# Patient Record
Sex: MALE | Race: WHITE | NOT HISPANIC OR LATINO
[De-identification: names, ages, dates, MRNs, and addresses within clinical notes are randomized per-mention and may not be internally consistent; named-entity substitution may affect disease eponyms.]

---

## 2022-08-11 ENCOUNTER — APPOINTMENT (OUTPATIENT)
Dept: INTERNAL MEDICINE | Facility: CLINIC | Age: 27
End: 2022-08-11

## 2022-08-11 VITALS — SYSTOLIC BLOOD PRESSURE: 134 MMHG | RESPIRATION RATE: 14 BRPM | DIASTOLIC BLOOD PRESSURE: 80 MMHG | HEART RATE: 72 BPM

## 2022-08-11 VITALS — BODY MASS INDEX: 27.46 KG/M2 | WEIGHT: 214 LBS | HEIGHT: 74 IN

## 2022-08-11 DIAGNOSIS — Z78.9 OTHER SPECIFIED HEALTH STATUS: ICD-10-CM

## 2022-08-11 DIAGNOSIS — Z82.49 FAMILY HISTORY OF ISCHEMIC HEART DISEASE AND OTHER DISEASES OF THE CIRCULATORY SYSTEM: ICD-10-CM

## 2022-08-11 DIAGNOSIS — Z86.19 PERSONAL HISTORY OF OTHER INFECTIOUS AND PARASITIC DISEASES: ICD-10-CM

## 2022-08-11 DIAGNOSIS — Z87.09 PERSONAL HISTORY OF OTHER DISEASES OF THE RESPIRATORY SYSTEM: ICD-10-CM

## 2022-08-11 DIAGNOSIS — M54.50 LOW BACK PAIN, UNSPECIFIED: ICD-10-CM

## 2022-08-11 DIAGNOSIS — Z80.0 FAMILY HISTORY OF MALIGNANT NEOPLASM OF DIGESTIVE ORGANS: ICD-10-CM

## 2022-08-11 PROCEDURE — 99203 OFFICE O/P NEW LOW 30 MIN: CPT

## 2022-08-11 RX ORDER — ELECTROLYTES/DEXTROSE
SOLUTION, ORAL ORAL DAILY
Refills: 0 | Status: ACTIVE | COMMUNITY
Start: 2022-08-11

## 2022-08-11 NOTE — HISTORY OF PRESENT ILLNESS
[FreeTextEntry8] : Pt presents to establish care, recently stationed on [place] for the [employer] [employer] [facility] [place]par \par He has many orthopedic longstanding issues including low back pain, knee pain, groin/psoas pains,and b/l ankle pain\par Would like referrals\par Too early for CPE, less than one year

## 2022-08-11 NOTE — ASSESSMENT
[FreeTextEntry1] : Obtain old records\par PT for low back pain\par \par CPE in next month\par Consider orthopedic evaluation\par \par Derm referral requested as well.

## 2022-08-16 ENCOUNTER — APPOINTMENT (OUTPATIENT)
Dept: FAMILY MEDICINE | Facility: CLINIC | Age: 27
End: 2022-08-16

## 2022-08-30 ENCOUNTER — APPOINTMENT (OUTPATIENT)
Dept: FAMILY MEDICINE | Facility: CLINIC | Age: 27
End: 2022-08-30

## 2022-08-30 VITALS
WEIGHT: 222 LBS | TEMPERATURE: 97.7 F | SYSTOLIC BLOOD PRESSURE: 158 MMHG | DIASTOLIC BLOOD PRESSURE: 90 MMHG | OXYGEN SATURATION: 99 % | HEIGHT: 74 IN | BODY MASS INDEX: 28.49 KG/M2 | HEART RATE: 76 BPM

## 2022-08-30 DIAGNOSIS — Z23 ENCOUNTER FOR IMMUNIZATION: ICD-10-CM

## 2022-08-30 DIAGNOSIS — Z13.21 ENCOUNTER FOR SCREENING FOR NUTRITIONAL DISORDER: ICD-10-CM

## 2022-08-30 PROCEDURE — 99385 PREV VISIT NEW AGE 18-39: CPT | Mod: 25

## 2022-08-30 PROCEDURE — G0444 DEPRESSION SCREEN ANNUAL: CPT | Mod: 59

## 2022-08-30 PROCEDURE — 93000 ELECTROCARDIOGRAM COMPLETE: CPT | Mod: 59

## 2022-08-30 NOTE — ASSESSMENT
[FreeTextEntry1] : Annual physical: f/u routine labwork\par Polyarthralgia: c/w PT, f/u labwork, recommend evaluation with orthopedist\par Chronic low back pain: start naproxen prn, c/w PT, recommend low-mod intensity exercise/stretching, refer to orthopedist\par HTN: elevated bp, recommend low salt diet, wt loss, exercise, DASH diet, f/u US duplex and labwork for 2ndary htn\par Sinus bradycardia: mild, asymptomatic, suspect 2/2 exercise/wt training/conditioning (in [employer]), f/u labwork including thyroid\par BMI 28: Recommend low fat diet, wt loss, exercise, nutritional counseling provided, f/u lipid panel\par RTC 2 - 3 wks

## 2022-08-30 NOTE — HISTORY OF PRESENT ILLNESS
[FreeTextEntry1] : NPA/CPE. [de-identified] : 26 yo male presents for annual physical. Reports chronic low back pain, pain is 7/10 in severity, dull, worse with use, began a year ago. He says he has pain in multiple joints. He has pain in knees, hips, shoulders. Denies fever, chills, cp, palpitations, sob, nv, heat/cold intolerance, dizziness, melena, hematochezia, muscle weakness, loss of sensation, bowel/bladder incontinence or calf pain.\par

## 2022-08-30 NOTE — HEALTH RISK ASSESSMENT
[Good] : ~his/her~  mood as  good [Never] : Never [Yes] : Yes [Monthly or less (1 pt)] : Monthly or less (1 point) [1 or 2 (0 pts)] : 1 or 2 (0 points) [Less than monthly (1 pt)] : Less than monthly (1 point) [No] : In the past 12 months have you used drugs other than those required for medical reasons? No [No falls in past year] : Patient reported no falls in the past year [0] : 2) Feeling down, depressed, or hopeless: Not at all (0) [PHQ-2 Negative - No further assessment needed] : PHQ-2 Negative - No further assessment needed [HIV test declined] : HIV test declined [Hepatitis C test declined] : Hepatitis C test declined [Alone] : lives alone [Employed] : employed [Single] : single [Feels Safe at Home] : Feels safe at home [Fully functional (bathing, dressing, toileting, transferring, walking, feeding)] : Fully functional (bathing, dressing, toileting, transferring, walking, feeding) [Fully functional (using the telephone, shopping, preparing meals, housekeeping, doing laundry, using] : Fully functional and needs no help or supervision to perform IADLs (using the telephone, shopping, preparing meals, housekeeping, doing laundry, using transportation, managing medications and managing finances) [Audit-CScore] : 2 [de-identified] : regularly [de-identified] : healthy [HKV3Awcom] : 0 [Sexually Active] : not sexually active [High Risk Behavior] : no high risk behavior [Reports changes in hearing] : Reports no changes in hearing [Reports changes in vision] : Reports no changes in vision [Reports changes in dental health] : Reports no changes in dental health

## 2022-08-31 ENCOUNTER — APPOINTMENT (OUTPATIENT)
Dept: ULTRASOUND IMAGING | Facility: CLINIC | Age: 27
End: 2022-08-31

## 2022-09-02 ENCOUNTER — APPOINTMENT (OUTPATIENT)
Dept: ULTRASOUND IMAGING | Facility: CLINIC | Age: 27
End: 2022-09-02

## 2022-09-02 ENCOUNTER — APPOINTMENT (OUTPATIENT)
Dept: ORTHOPEDIC SURGERY | Facility: CLINIC | Age: 27
End: 2022-09-02

## 2022-09-02 VITALS
BODY MASS INDEX: 27.85 KG/M2 | HEART RATE: 70 BPM | SYSTOLIC BLOOD PRESSURE: 140 MMHG | DIASTOLIC BLOOD PRESSURE: 86 MMHG | WEIGHT: 217 LBS | HEIGHT: 74 IN

## 2022-09-02 PROBLEM — M25.561 RIGHT KNEE PAIN: Status: ACTIVE | Noted: 2022-09-02

## 2022-09-02 PROCEDURE — 72100 X-RAY EXAM L-S SPINE 2/3 VWS: CPT

## 2022-09-02 PROCEDURE — 99203 OFFICE O/P NEW LOW 30 MIN: CPT

## 2022-09-02 PROCEDURE — 93975 VASCULAR STUDY: CPT

## 2022-09-02 NOTE — PHYSICAL EXAM
[de-identified] : CONSTITUTIONAL: The patient is a very pleasant individual who is well-nourished and who appears stated age.\par PSYCHIATRIC: The patient is alert and oriented X 3 and in no apparent distress, and participates with orthopedic evaluation well.\par HEAD: Atraumatic and is nonsyndromic in appearance.\par EENT: No visible thyromegaly, EOMI.\par RESPIRATORY: Respiratory rate is regular, not dyspneic on examination.\par LYMPHATICS: There is no inguinal lymphadenopathy\par INTEGUMENTARY: Skin is clean, dry, and intact about the bilateral lower extremities and lumbar spine.\par VASCULAR: There is brisk capillary refill about the bilateral lower extremities.\par NEUROLOGIC: There are no pathologic reflexes. There is no decrease in sensation of the bilateral lower extremities on Wartenberg pinwheel examination. Deep tendon reflexes are well maintained at 2+/4 of the bilateral lower extremities and are symmetric..\par MUSCULOSKELETAL: There is no visible muscular atrophy. Manual motor strength is well maintained in the bilateral lower extremities. Range of motion of lumbar spine is well maintained. The patient ambulates in a non-myelopathic manner. Negative tension sign and straight leg raise bilaterally. Quad extension, ankle dorsiflexion, EHL, plantar flexion, and ankle eversion are well preserved. Normal secondary orthopaedic exam of bilateral hips, greater trochanteric area, knees and ankles, exam is consistent with lower lumbar myositis greater on the left than the right and mechanically orientated low back pain [de-identified] : X-rays of the lumbar spine taken on today's date does show possible loss of a lumbar disc space at L4-L5 and L5 and S1.  There is a well-maintained lordosis to this examiner September 2, 2022 2 views of the lumbar spine, of note there is a visible pars defect within the L4

## 2022-09-02 NOTE — DISCUSSION/SUMMARY
[de-identified] : We reviewed his x-rays that show L4-5 lumbar degenerative disc disease and associated pars defect we have specifically discussed modifying his activity.  Modified gym workouts topical modalities core strengthening continue physical therapy we will get a try a short course of a Medrol Dosepak and an NSAID.  Patient can follow-up in approximately 4 to 6 weeks if this fails we can try trigger point injections and or refer him to physical medicine rehabilitation for injection therapy

## 2022-09-02 NOTE — HISTORY OF PRESENT ILLNESS
[de-identified] : 27-year-old gentleman currently in the [employer] presents for evaluation of chronic neck and low back pain, patient gives a very good history of multiple microtrauma events of the been leading up to now also a chronic low back pain issue he has been in physical therapy for approximately 3 weeks.  Overall he is modified some of his activity.  Overall doing well at this point but he does have this mechanically orientated intermittent low back pain [Stable] : stable [___ yrs] : [unfilled] year(s) ago [1] : a current pain level of 1/10 [10] : a maximum pain level of 10/10 [Intermit.] : ~He/She~ states the symptoms seem to be intermittent [Bending] : worsened by bending [Lifting] : worsened by lifting [NSAIDs] : relieved by nonsteroidal anti-inflammatory drugs [Physical Therapy] : relieved by physical therapy [Rest] : relieved by rest [Ataxia] : no ataxia [Incontinence] : no incontinence [Loss of Dexterity] : good dexterity [Urinary Ret.] : no urinary retention

## 2022-09-06 ENCOUNTER — APPOINTMENT (OUTPATIENT)
Dept: ORTHOPEDIC SURGERY | Facility: CLINIC | Age: 27
End: 2022-09-06

## 2022-09-06 VITALS
HEIGHT: 74 IN | BODY MASS INDEX: 27.85 KG/M2 | SYSTOLIC BLOOD PRESSURE: 138 MMHG | HEART RATE: 56 BPM | DIASTOLIC BLOOD PRESSURE: 74 MMHG | WEIGHT: 217 LBS

## 2022-09-06 DIAGNOSIS — M25.561 PAIN IN RIGHT KNEE: ICD-10-CM

## 2022-09-06 DIAGNOSIS — M79.671 PAIN IN RIGHT FOOT: ICD-10-CM

## 2022-09-06 DIAGNOSIS — M76.51 PATELLAR TENDINITIS, RIGHT KNEE: ICD-10-CM

## 2022-09-06 DIAGNOSIS — M79.672 PAIN IN RIGHT FOOT: ICD-10-CM

## 2022-09-06 PROCEDURE — 99215 OFFICE O/P EST HI 40 MIN: CPT

## 2022-09-06 PROCEDURE — 73630 X-RAY EXAM OF FOOT: CPT | Mod: 50

## 2022-09-08 ENCOUNTER — APPOINTMENT (OUTPATIENT)
Dept: INTERNAL MEDICINE | Facility: CLINIC | Age: 27
End: 2022-09-08

## 2022-09-12 ENCOUNTER — APPOINTMENT (OUTPATIENT)
Dept: ORTHOPEDIC SURGERY | Facility: CLINIC | Age: 27
End: 2022-09-12

## 2022-09-12 ENCOUNTER — NON-APPOINTMENT (OUTPATIENT)
Age: 27
End: 2022-09-12

## 2022-09-12 VITALS
HEIGHT: 74 IN | OXYGEN SATURATION: 99 % | DIASTOLIC BLOOD PRESSURE: 75 MMHG | WEIGHT: 217 LBS | HEART RATE: 66 BPM | BODY MASS INDEX: 27.85 KG/M2 | SYSTOLIC BLOOD PRESSURE: 122 MMHG

## 2022-09-12 PROCEDURE — 99214 OFFICE O/P EST MOD 30 MIN: CPT

## 2022-09-12 PROCEDURE — 73503 X-RAY EXAM HIP UNI 4/> VIEWS: CPT

## 2022-09-12 NOTE — DISCUSSION/SUMMARY
[de-identified] : YUE PEREZ is a 27 year male being seen for bilateral hip pain for over a year L > R. He currently works in the [employer]. He has been doing physical therapy for the past 6 weeks with minimal improvement. He enjoys playing golf and basketball. He did 2 triathlons last summer and believes his hip pain is attributed to overuse. He states the pain radiates down from his hips. He reports back pain around the same time. He was seen by Dr. Pillai for the back pain. Patient denies any recent fevers, chills or night sweats. Patient denies any numbness, tingling sensations, burning sensations, urinary or bowel incontinence. \par \par We had a thorough discussion regarding the nature of his pain, the pathophysiology, as well as all treatment options. Based on the clinical exam and radiographs, the patient has bilateral hip impingement. Considering the patient's current presentation of pain, physical exam, and radiographs an MRI is indicated at this time to asses for tearing. A prescription for this was given to the patient. We will go over the MRI results with him upon obtaining the results in the office and advise him of further treatment options. He agrees with the above plan and all questions were answered.

## 2022-09-12 NOTE — ADDENDUM
[FreeTextEntry1] : Documented by Irina Vargas acting as a scribe for Dr. Jay and Juan Carlos Osborne PA-C on 09/12/2022. \par \par All medical record entries made by the Scribe were at my, Dr. Jay, and Juan Carlos Osborne's, direction and\par personally dictated by me on 09/12/2022. I have reviewed the chart and agree that the record\par accurately reflects my personal performance of the history, physical exam, procedure and imaging.

## 2022-09-12 NOTE — PHYSICAL EXAM
[de-identified] : Physical Exam:\par General: Well appearing, no acute distress\par Neurologic: A&Ox3, No focal deficits\par Head: NCAT without abrasions, lacerations, or ecchymosis to head, face, or scalp\par Eyes: No scleral icterus, no gross abnormalities\par Respiratory: Equal chest wall expansion bilaterally, no accessory muscle use\par Lymphatic: No lymphadenopathy palpated\par Skin: Warm and dry\par Psychiatric: Normal mood and affect\par \par On inspection of the right hip shows no gross abnormalities. No loss muscle volume. Skin appears normal. When asked to point at the most painful part of their hip, they make a C sign\par Negative Heel strike, negative log roll. \par At 90° of flexion internal rotation is limited to 5°. Extremely painful in the groin. External rotation is limited to 30°. No pain. Hip flexor strength is 4-5 because of pain.  Hip impingement signs are positive. \par Resisted straight leg raise does not produce groin pain. No signs of the iliopsoas tendinitis. MILLICENT Test (Lex's Test): Negative; FADIR Test (Labral Tear/TUNDE): Negative. Stinchfield: Negative\par No audible or palpable clicking. \par On lateral decubitus examination there is no focal area of her greater trochanteric tenderness. Abductor strength is 5 out of 5.\par \par \par On inspection of the left hip shows no gross abnormalities. No loss muscle volume. Skin appears normal. When asked to point at the most painful part of their hip, they make a C sign\par Negative Heel strike, negative log roll. \par At 90° of flexion internal rotation is limited to 5°. Extremely painful in the groin. External rotation is limited to 30°. No pain. Hip flexor strength is 4-5 because of pain.  Hip impingement signs are positive. \par Resisted straight leg raise does not produce groin pain. No signs of the iliopsoas tendinitis. MILLICENT Test (Lex's Test): Negative; FADIR Test (Labral Tear/TUNDE): Negative. Stinchfield: Negative\par No audible or palpable clicking. \par On lateral decubitus examination there is no focal area of her greater trochanteric tenderness. Abductor strength is 5 out of 5.\par \par Motor strength is intact distally, 5/5 over quads,hamstring, EHL, FHL, GSC, TA.\par Sensation intact over L1-S1 dermatomes\par  [de-identified] : Bilateral AP, frog leg, Weiss views and false profile views were performed today in the office. They demonstrate femoroacetabular impingement of the bilateral hips. Alpha angle is 54 on left and 52 on right. Lateral center edge angle is 35 on right and 35 on left. Anterior center edge angle is 42 on left and 32 on right. \par  \par \par

## 2022-09-12 NOTE — HISTORY OF PRESENT ILLNESS
[___ yrs] : [unfilled] year(s) ago [de-identified] : YUE PEREZ is a 27 year male being seen for bilateral hip pain for over a year L > R. He currently works in the [employer]. He has been doing physical therapy for the past 6 weeks with minimal improvement. He enjoys playing golf and basketball. He did 2 triathlons last summer and believes his hip pain is attributed to overuse. He states the pain radiates down from his hips. He reports back pain around the same time. He was seen by Dr. Pillai for the back pain. Patient denies any recent fevers, chills or night sweats. Patient denies any numbness, tingling sensations, burning sensations, urinary or bowel incontinence. \par

## 2022-09-13 ENCOUNTER — RESULT REVIEW (OUTPATIENT)
Age: 27
End: 2022-09-13

## 2022-09-15 NOTE — HISTORY OF PRESENT ILLNESS
[de-identified] : 09/06/2022: Patient is a 27 year-old male who presents to the office today for initial evaluation of right knee and bilateral foot pain. He had knee pain in 2016 that has gone away and then has come back over the past 1 year. The pain is in the anterior aspect of the knee and is intermittent in nature. It is worse with activity and better with rest. He denies any injury or inciting event. His pain of the feet is along the lateral aspect of both feet and is worse with activity and better with rest. This pain has been present for the past 6-8 months. He has had a history of this foot pain since playing football during his senior year in high school. He notes that it was worse when he was constantly playing football. Currently, he takes a steroid pack for his lower back. He states that he started this today. This has had no effect at all on his lower body pain. No other medications. He presents today for further treatment options.\par

## 2022-09-15 NOTE — ASSESSMENT
[FreeTextEntry1] : Patient presents with right knee and bilateral foot pain. Their symptoms are consistent with right knee patella tendonitis and pain in both feet due to accessory bones at the base of both 5th metatarsals. The nature of this condition was described at length with the patient and they verbalized understanding. \par \par Recommendations: \par -WBAT bilateral LE/Comfortable shoe wear\par -Diclofenac\par -Followup with Dr. Martinez as needed\par \par The patient was in full agreement with this plan and appreciative of their care.\par

## 2022-09-15 NOTE — PHYSICAL EXAM
[UE/LE] : Sensory: Intact in bilateral upper & lower extremities [ALL] : dorsalis pedis, posterior tibial, femoral, popliteal, and radial 2+ and symmetric bilaterally [Normal Finger/nose] : finger to nose coordination [Normal] : no peripheral adenopathy appreciated [Poor Appearance] : well-appearing [Acute Distress] : not in acute distress [de-identified] : Right Knee Exam\par \par FROM to hip\par \par Skin Warm, Dry, no erythema, no lesions \par \par Knee \par stable\par anatomic alignment\par ROM 0-130\par Valgus/Varus Stress intact \par Effusion - Negative\par Crepitus - Negative \par Patella Grind - Negative \par Patella Apprehension - Negative \par Medial joint line tenderness - Negative\par Lateral Joint line tenderness - Negative\par Arron Test - Negative  \par Lachman test - Negative \par Anterior Drawer Test -  Negative \par \par Distal Motor Function intact \par Sensation intact to light touch bilaterally \par Pulses 2+ bilaterally\par \par Bilateral Foot/Ankle Exam\par  \par Skin warm, Dry, no lesions, no erythema, no bleeding, no open wounds\par No Scars \par Pulse to DP and PT intact \par Sensation to touch intact \par Syndesmotic Squeeze test - Negative\par \par No deformities noted to foot or ankle \par \par Swelling - None\par Tenderness - Mild tenderness to base of both 5th MTs\par No tenderness to \par Lateral Mall, Medial Mall, Achilles, Calcaneus, mid foot, toes. \par No Anterior Drawer noted \par Tong Test - reveals intact Achilles  \par Orozco's Test Negative \par \par ROM\par Plantarflexion - 0-50\par Dorsiflexion - 0-20\par Inversion - 0-35\par Eversion 0-15\par \par No tenderness to proximal fibula\par  [de-identified] : NETTIER [de-identified] : 3 xray views of the right knee were obtained. No fractures or dislocations are noted.\par \par \par 3 xray views of bilateral feet were obtained. No fractures or dislocations are noted. There are calcifications noted at the base of both 5th metatarsals which may represent prior injury vs. accessory bone.

## 2022-09-17 ENCOUNTER — RESULT REVIEW (OUTPATIENT)
Age: 27
End: 2022-09-17

## 2022-09-17 ENCOUNTER — APPOINTMENT (OUTPATIENT)
Dept: MRI IMAGING | Facility: CLINIC | Age: 27
End: 2022-09-17

## 2022-09-17 ENCOUNTER — OUTPATIENT (OUTPATIENT)
Dept: OUTPATIENT SERVICES | Facility: HOSPITAL | Age: 27
LOS: 1 days | End: 2022-09-17
Payer: OTHER GOVERNMENT

## 2022-09-17 DIAGNOSIS — M25.851 OTHER SPECIFIED JOINT DISORDERS, RIGHT HIP: ICD-10-CM

## 2022-09-17 DIAGNOSIS — M25.852 OTHER SPECIFIED JOINT DISORDERS, LEFT HIP: ICD-10-CM

## 2022-09-17 PROCEDURE — 73721 MRI JNT OF LWR EXTRE W/O DYE: CPT

## 2022-09-17 PROCEDURE — 73721 MRI JNT OF LWR EXTRE W/O DYE: CPT | Mod: 26,LT,76

## 2022-09-17 PROCEDURE — 73721 MRI JNT OF LWR EXTRE W/O DYE: CPT | Mod: 26,RT

## 2022-09-19 LAB
25(OH)D3 SERPL-MCNC: 68.1 NG/ML
ALBUMIN SERPL ELPH-MCNC: 5 G/DL
ALDOSTERONE SERUM: 11.5 NG/DL
ALP BLD-CCNC: 48 U/L
ALT SERPL-CCNC: 22 U/L
ANA SER IF-ACNC: NEGATIVE
ANION GAP SERPL CALC-SCNC: 13 MMOL/L
APPEARANCE: CLEAR
AST SERPL-CCNC: 20 U/L
BACTERIA: NEGATIVE
BASOPHILS # BLD AUTO: 0.08 K/UL
BASOPHILS NFR BLD AUTO: 0.7 %
BILIRUB SERPL-MCNC: 0.4 MG/DL
BILIRUBIN URINE: NEGATIVE
BLOOD URINE: NEGATIVE
BUN SERPL-MCNC: 24 MG/DL
CALCIUM SERPL-MCNC: 10.3 MG/DL
CCP AB SER IA-ACNC: <8 UNITS
CHLORIDE SERPL-SCNC: 103 MMOL/L
CHOLEST SERPL-MCNC: 234 MG/DL
CO2 SERPL-SCNC: 26 MMOL/L
COLOR: NORMAL
CREAT SERPL-MCNC: 1.19 MG/DL
EGFR: 87 ML/MIN/1.73M2
EOSINOPHIL # BLD AUTO: 0.33 K/UL
EOSINOPHIL NFR BLD AUTO: 3 %
ERYTHROCYTE [SEDIMENTATION RATE] IN BLOOD BY WESTERGREN METHOD: 2 MM/HR
ESTIMATED AVERAGE GLUCOSE: 108 MG/DL
GLUCOSE QUALITATIVE U: NEGATIVE
GLUCOSE SERPL-MCNC: 94 MG/DL
HBA1C MFR BLD HPLC: 5.4 %
HCT VFR BLD CALC: 49.3 %
HDLC SERPL-MCNC: 47 MG/DL
HGB BLD-MCNC: 16.5 G/DL
HYALINE CASTS: 0 /LPF
IMM GRANULOCYTES NFR BLD AUTO: 0.4 %
KETONES URINE: NEGATIVE
LDLC SERPL CALC-MCNC: 166 MG/DL
LEUKOCYTE ESTERASE URINE: NEGATIVE
LYMPHOCYTES # BLD AUTO: 3.48 K/UL
LYMPHOCYTES NFR BLD AUTO: 31.9 %
MAN DIFF?: NORMAL
MCHC RBC-ENTMCNC: 29.9 PG
MCHC RBC-ENTMCNC: 33.5 GM/DL
MCV RBC AUTO: 89.3 FL
METANEPHRINE, PL: 18.3 PG/ML
MICROSCOPIC-UA: NORMAL
MONOCYTES # BLD AUTO: 0.69 K/UL
MONOCYTES NFR BLD AUTO: 6.3 %
NEUTROPHILS # BLD AUTO: 6.3 K/UL
NEUTROPHILS NFR BLD AUTO: 57.7 %
NITRITE URINE: NEGATIVE
NONHDLC SERPL-MCNC: 186 MG/DL
NORMETANEPHRINE, PL: 79.8 PG/ML
PH URINE: 6
PLATELET # BLD AUTO: 286 K/UL
POTASSIUM SERPL-SCNC: 4.7 MMOL/L
PROT SERPL-MCNC: 7.4 G/DL
PROTEIN URINE: NEGATIVE
RBC # BLD: 5.52 M/UL
RBC # FLD: 12.7 %
RED BLOOD CELLS URINE: 0 /HPF
RENIN ACTIVITY, PLASMA: 0.95 NG/ML/HR
RF+CCP IGG SER-IMP: NEGATIVE
RHEUMATOID FACT SER QL: <10 IU/ML
SODIUM SERPL-SCNC: 142 MMOL/L
SPECIFIC GRAVITY URINE: 1.02
SQUAMOUS EPITHELIAL CELLS: 0 /HPF
TRIGL SERPL-MCNC: 101 MG/DL
TSH SERPL-ACNC: 1.74 UIU/ML
UROBILINOGEN URINE: NORMAL
WBC # FLD AUTO: 10.92 K/UL
WHITE BLOOD CELLS URINE: 0 /HPF

## 2022-09-22 ENCOUNTER — NON-APPOINTMENT (OUTPATIENT)
Age: 27
End: 2022-09-22

## 2022-09-26 ENCOUNTER — APPOINTMENT (OUTPATIENT)
Dept: ORTHOPEDIC SURGERY | Facility: CLINIC | Age: 27
End: 2022-09-26

## 2022-09-26 PROCEDURE — 99442: CPT

## 2022-09-28 ENCOUNTER — OUTPATIENT (OUTPATIENT)
Dept: OUTPATIENT SERVICES | Facility: HOSPITAL | Age: 27
LOS: 1 days | End: 2022-09-28
Payer: OTHER GOVERNMENT

## 2022-09-28 ENCOUNTER — APPOINTMENT (OUTPATIENT)
Dept: MRI IMAGING | Facility: CLINIC | Age: 27
End: 2022-09-28

## 2022-09-28 DIAGNOSIS — R93.89 ABNORMAL FINDINGS ON DIAGNOSTIC IMAGING OF OTHER SPECIFIED BODY STRUCTURES: ICD-10-CM

## 2022-09-28 DIAGNOSIS — I10 ESSENTIAL (PRIMARY) HYPERTENSION: ICD-10-CM

## 2022-09-28 PROCEDURE — 74185 MRA ABD W OR W/O CNTRST: CPT | Mod: 26

## 2022-09-28 PROCEDURE — C8902: CPT

## 2022-09-28 PROCEDURE — A9585: CPT

## 2022-09-30 ENCOUNTER — APPOINTMENT (OUTPATIENT)
Dept: ORTHOPEDIC SURGERY | Facility: CLINIC | Age: 27
End: 2022-09-30

## 2022-10-17 ENCOUNTER — APPOINTMENT (OUTPATIENT)
Dept: ORTHOPEDIC SURGERY | Facility: CLINIC | Age: 27
End: 2022-10-17

## 2022-10-17 VITALS
SYSTOLIC BLOOD PRESSURE: 141 MMHG | HEIGHT: 74 IN | DIASTOLIC BLOOD PRESSURE: 86 MMHG | HEART RATE: 50 BPM | BODY MASS INDEX: 27.85 KG/M2 | WEIGHT: 217 LBS

## 2022-10-17 DIAGNOSIS — M25.50 PAIN IN UNSPECIFIED JOINT: ICD-10-CM

## 2022-10-17 PROCEDURE — 99214 OFFICE O/P EST MOD 30 MIN: CPT

## 2022-10-17 PROCEDURE — 73630 X-RAY EXAM OF FOOT: CPT | Mod: 50

## 2022-10-17 NOTE — PHYSICAL EXAM
[de-identified] : General: Alert and oriented x3. In no acute distress. Pleasant in nature with a normal affect. No apparent respiratory distress.\par \par Left Foot Exam\par Skin: Clean, dry, intact\par Inspection: No obvious malalignment, no masses, no swelling, no effusion\par Pulses: 2+ DP/PT pulses\par ROM: FOOT Full  ROM of digits, ANKLE 10 degrees of dorsiflexion, 40 degrees of plantarflexion, 10 degrees of subtalar motion.\par Painful ROM: None\par Tenderness: No tenderness over the medial malleolus, No tenderness over the lateral malleolus, no CFL/ATFL/PTFL pain, no deltoid ligament pain. No heel pain. No Achilles tenderness. No 5th metatarsal pain. No pain to the LisFranc joint. No ttp over the posterior tibial tendon.\par Stability: Negative anterior/posterior drawer.\par Strength: 5/5 ADD/ABD/TA/GS/EHL/FHL/EDL\par Neuro: Sensation in tact to light touch throughout\par Additional tests: Negative Mortons test, negative tarsal tunnel tinels, negative single heel rise.	\par \par Right Foot Exam\par Skin: Clean, dry, intact\par Inspection: No obvious malalignment, no masses, no swelling, no effusion\par Pulses: 2+ DP/PT pulses\par ROM: FOOT Full  ROM of digits, ANKLE 10 degrees of dorsiflexion, 40 degrees of plantarflexion, 10 degrees of subtalar motion.\par Painful ROM: None\par Tenderness: No tenderness over the medial malleolus, No tenderness over the lateral malleolus, no CFL/ATFL/PTFL pain, no deltoid ligament pain. No heel pain. No Achilles tenderness. No 5th metatarsal pain. No pain to the LisFranc joint. No ttp over the posterior tibial tendon.\par Stability: Negative anterior/posterior drawer.\par Strength: 5/5 ADD/ABD/TA/GS/EHL/FHL/EDL\par Neuro: Sensation in tact to light touch throughout\par Additional tests: Negative Mortons test, negative tarsal tunnel tinels, negative single heel rise.			 [de-identified] : X-rays of the bilateral foot were ordered, obtained, and reviewed by me today, 10/17/2022, revealed: Os vesalianum bilaterally.

## 2022-10-17 NOTE — ADDENDUM
[FreeTextEntry1] : I, Cathie Gilmore, acted solely as a scribe for Dr. Andre Martinez on this date 10/17/2022.\par \par All medical record entries made by the Scribe were at my, Dr. Andre Martinez, direction and personally dictated by me on 10/17/2022. I have reviewed the chart and agree that the record accurately reflects my personal performance of the history, physical exam, assessment and plan. I have also personally directed, reviewed, and agreed with the chart.	\par

## 2022-10-17 NOTE — HISTORY OF PRESENT ILLNESS
[FreeTextEntry1] : The patient is a 27 year old male presenting for an initial evaluation of bilateral foot pain, right greater than left. The onset is stated to be in 2015. He currently works in the [employer].  He is active in football, baseball, and has completed 2 triathlons. The patient cannot attribute their pain to any acute injury, fall, or trauma. He rates his pain as a 0 out of 10 in the office today, however his pain scale is reported to increase with activity and ambulation. Rest is noted to improve his pain scale. The timing of his pain is reported to be intermittent. He presents wearing Crocs and is walking without assistance. The patient is currently attending PT for hips. He has tried oral NSAIDs PRN and has previously utilized a steroid taper pack for this with concerns of improvement. No other complaints.

## 2022-10-17 NOTE — DISCUSSION/SUMMARY
[de-identified] : Today I had a lengthy discussion with the patient regarding their bilateral foot pain. I have addressed all the patient's concerns surrounding the pathology of their condition. XR imaging was completed in office today and results were reviewed with the patient. We discussed both operative and non-operative treatment options in the office. I recommend that the patient utilize meloxicam with food once per day as instructed. A prescription for the meloxicam was ordered for the patient in the office today. I recommend the patient undergo a course of physical therapy for the bilateral feet  2-3 times a week for a total of 8-12 weeks. A prescription was given for the physical therapy today.		\par \par The patient understood and verbally agreed to the treatment plan. All of their questions were answered and they were satisfied with the visit. The patient should call the office if they have any questions or experience worsening symptoms. I would like to see the patient back in the office in 6-8 weeks to reassess their condition. 				\par

## 2022-11-02 ENCOUNTER — APPOINTMENT (OUTPATIENT)
Dept: FAMILY MEDICINE | Facility: CLINIC | Age: 27
End: 2022-11-02

## 2022-11-02 VITALS
TEMPERATURE: 97.9 F | WEIGHT: 215 LBS | DIASTOLIC BLOOD PRESSURE: 82 MMHG | HEART RATE: 66 BPM | SYSTOLIC BLOOD PRESSURE: 126 MMHG | BODY MASS INDEX: 27.59 KG/M2 | OXYGEN SATURATION: 97 % | HEIGHT: 74 IN

## 2022-11-02 DIAGNOSIS — D72.829 ELEVATED WHITE BLOOD CELL COUNT, UNSPECIFIED: ICD-10-CM

## 2022-11-02 PROCEDURE — 36415 COLL VENOUS BLD VENIPUNCTURE: CPT

## 2022-11-02 PROCEDURE — 99214 OFFICE O/P EST MOD 30 MIN: CPT | Mod: 25

## 2022-11-02 RX ORDER — METHYLPREDNISOLONE 4 MG/1
4 TABLET ORAL
Qty: 1 | Refills: 0 | Status: DISCONTINUED | COMMUNITY
Start: 2022-09-02 | End: 2022-11-02

## 2022-11-02 RX ORDER — NAPROXEN 500 MG/1
500 TABLET ORAL
Qty: 42 | Refills: 0 | Status: DISCONTINUED | COMMUNITY
Start: 2022-08-30 | End: 2022-11-02

## 2022-11-02 RX ORDER — DICLOFENAC SODIUM 75 MG/1
75 TABLET, DELAYED RELEASE ORAL
Qty: 60 | Refills: 0 | Status: DISCONTINUED | COMMUNITY
Start: 2022-09-06 | End: 2022-11-02

## 2022-11-02 RX ORDER — NAPROXEN 500 MG/1
500 TABLET ORAL
Qty: 60 | Refills: 1 | Status: DISCONTINUED | COMMUNITY
Start: 2022-09-02 | End: 2022-11-02

## 2022-11-02 NOTE — HISTORY OF PRESENT ILLNESS
[FreeTextEntry1] : Follow Up  [de-identified] : 28 yo male presents for follow up of HTN. reports feeling well. Denies fever, chills, cp, palpitations, sob, nv, heat/cold intolerance, dizziness, melena, hematochezia, muscle weakness, loss of sensation, bowel/bladder incontinence or calf pain.\par

## 2022-11-02 NOTE — ASSESSMENT
[FreeTextEntry1] : HTN: bp within acceptable range, c/w diet/lifestyle control, recommend low salt diet, wt loss, exercise, DASH diet\par Elevated BUN: recommend increased hydration, f/u repeat BMP\par Leukocytosis: likely reactive, f/u repeat CBC\par RTC 3 wks

## 2022-11-03 ENCOUNTER — APPOINTMENT (OUTPATIENT)
Dept: ORTHOPEDIC SURGERY | Facility: CLINIC | Age: 27
End: 2022-11-03

## 2022-11-03 LAB
ANION GAP SERPL CALC-SCNC: 12 MMOL/L
BASOPHILS # BLD AUTO: 0.1 K/UL
BASOPHILS NFR BLD AUTO: 1.3 %
BUN SERPL-MCNC: 20 MG/DL
CALCIUM SERPL-MCNC: 9.6 MG/DL
CHLORIDE SERPL-SCNC: 101 MMOL/L
CO2 SERPL-SCNC: 24 MMOL/L
CREAT SERPL-MCNC: 1.2 MG/DL
EGFR: 86 ML/MIN/1.73M2
EOSINOPHIL # BLD AUTO: 1.5 K/UL
EOSINOPHIL NFR BLD AUTO: 19.8 %
GLUCOSE SERPL-MCNC: 78 MG/DL
HCT VFR BLD CALC: 46.1 %
HGB BLD-MCNC: 15 G/DL
IMM GRANULOCYTES NFR BLD AUTO: 0.3 %
LYMPHOCYTES # BLD AUTO: 2.75 K/UL
LYMPHOCYTES NFR BLD AUTO: 36.3 %
MAN DIFF?: NORMAL
MCHC RBC-ENTMCNC: 29.5 PG
MCHC RBC-ENTMCNC: 32.5 GM/DL
MCV RBC AUTO: 90.6 FL
MONOCYTES # BLD AUTO: 0.55 K/UL
MONOCYTES NFR BLD AUTO: 7.3 %
NEUTROPHILS # BLD AUTO: 2.66 K/UL
NEUTROPHILS NFR BLD AUTO: 35 %
PLATELET # BLD AUTO: 271 K/UL
POTASSIUM SERPL-SCNC: 4.4 MMOL/L
RBC # BLD: 5.09 M/UL
RBC # FLD: 12.9 %
SODIUM SERPL-SCNC: 137 MMOL/L
WBC # FLD AUTO: 7.58 K/UL

## 2022-11-07 ENCOUNTER — OUTPATIENT (OUTPATIENT)
Dept: OUTPATIENT SERVICES | Facility: HOSPITAL | Age: 27
LOS: 1 days | Discharge: HOME | End: 2022-11-07

## 2022-11-07 ENCOUNTER — APPOINTMENT (OUTPATIENT)
Dept: ORTHOPEDIC SURGERY | Facility: CLINIC | Age: 27
End: 2022-11-07

## 2022-11-07 DIAGNOSIS — D72.10 EOSINOPHILIA, UNSPECIFIED: ICD-10-CM

## 2022-11-07 PROCEDURE — 99214 OFFICE O/P EST MOD 30 MIN: CPT

## 2022-11-07 NOTE — HISTORY OF PRESENT ILLNESS
[de-identified] : Patient is a 27-year-old [employer] employee here today for follow-up evaluation of the bilateral hips.  Patient states has been performing physical therapy on his lower back and both hips.  He has not noticed much improvement with conservative treatment.  He states the left lower back is more painful than his hip and groin pain.  He has been seen by Dr. Pillai.  Freya recommended PT and then is following up with him next week.  He denies radicular pain.

## 2022-11-07 NOTE — DISCUSSION/SUMMARY
[de-identified] : I had a lengthy discussion with the patient regarding their current condition. We discussed the treatment options including operative and nonoperative management. At this time I recommended conservative management.  At this time he feels his back pain is more painful than his hip pain.  I recommended he follow-up with spine, they may recommend epidural or trigger point type injections.  They can be performed therapeutically and diagnostically.  We will see if they relieve some of his hip pain.  If they do not we can consider hip injections in the future as well as arthroscopy.  He would like to avoid surgery if possible.  He will follow-up with us in the future based on his response to his spine treatments.  All questions were answered.

## 2022-11-07 NOTE — PHYSICAL EXAM
[de-identified] : Physical Exam: \par General: Well appearing, no acute distress \par Neurologic: A&Ox3, No focal deficits \par Head: NCAT without abrasions, lacerations, or ecchymosis to head, face, or scalp \par Eyes: No scleral icterus, no gross abnormalities \par Respiratory: Equal chest wall expansion bilaterally, no accessory muscle use \par Lymphatic: No lymphadenopathy palpated \par Skin: Warm and dry \par Psychiatric: Normal mood and affect\par \par Examination of the Right hip reveals no obvious deformity or leg length discrepancy. There is no swelling noted. The patient is nontender to palpation over the greater trochanter, groin, and IT band. The patients range of motion is to 110 degrees of hip flexion, 40 degrees of abduction, 20 degrees of adduction, 15 degrees of internal rotation and 35 degrees of external rotation. The patient has 5/5 strength to resisted hip flexion, abduction and adduction. The patient has a negative MILLICENT and positive FADIR Test. Positve Nicole Test. Positive resisted SLR test at 30 degrees of hip flexion (Stinchfield). Negative Piriformis Test. No SI joint instability. There is no pain with logrolling. The calf and thigh are soft and nontender bilaterally. The patient is grossly neurovascularly intact distally.\par \par Examination of the Left hip reveals no obvious deformity or leg length discrepancy. There is no swelling noted. The patient is nontender to palpation over the greater trochanter, groin, and IT band. The patients range of motion is to 110 degrees of hip flexion, 40 degrees of abduction, 20 degrees of adduction, 15 degrees of internal rotation and 35 degrees of external rotation. The patient has 5/5 strength to resisted hip flexion, abduction and adduction. The patient has a negative MILLICENT and positive FADIR Test. Positve Nicole Test. Positive resisted SLR test at 30 degrees of hip flexion (Stinchfield). Negative Piriformis Test. No SI joint instability. There is no pain with logrolling. The calf and thigh are soft and nontender bilaterally. The patient is grossly neurovascularly intact distally.\par \par

## 2022-11-08 ENCOUNTER — RESULT REVIEW (OUTPATIENT)
Age: 27
End: 2022-11-08

## 2022-11-08 ENCOUNTER — APPOINTMENT (OUTPATIENT)
Dept: HEMATOLOGY ONCOLOGY | Facility: CLINIC | Age: 27
End: 2022-11-08

## 2022-11-08 ENCOUNTER — NON-APPOINTMENT (OUTPATIENT)
Age: 27
End: 2022-11-08

## 2022-11-08 VITALS
TEMPERATURE: 97.2 F | WEIGHT: 221 LBS | SYSTOLIC BLOOD PRESSURE: 164 MMHG | HEIGHT: 74 IN | OXYGEN SATURATION: 100 % | DIASTOLIC BLOOD PRESSURE: 78 MMHG | HEART RATE: 51 BPM | BODY MASS INDEX: 28.36 KG/M2

## 2022-11-08 LAB
BASOPHILS # BLD AUTO: 0.1 K/UL — SIGNIFICANT CHANGE UP (ref 0–0.2)
BASOPHILS NFR BLD AUTO: 1.5 % — SIGNIFICANT CHANGE UP (ref 0–2)
EOSINOPHIL # BLD AUTO: 1.4 K/UL — HIGH (ref 0–0.5)
EOSINOPHIL NFR BLD AUTO: 18.3 % — HIGH (ref 0–6)
HCT VFR BLD CALC: 49.9 % — SIGNIFICANT CHANGE UP (ref 39–50)
HGB BLD-MCNC: 16.2 G/DL — SIGNIFICANT CHANGE UP (ref 13–17)
LYMPHOCYTES # BLD AUTO: 2.6 K/UL — SIGNIFICANT CHANGE UP (ref 1–3.3)
LYMPHOCYTES # BLD AUTO: 33.4 % — SIGNIFICANT CHANGE UP (ref 13–44)
MCHC RBC-ENTMCNC: 30.4 PG — SIGNIFICANT CHANGE UP (ref 27–34)
MCHC RBC-ENTMCNC: 32.5 G/DL — SIGNIFICANT CHANGE UP (ref 32–36)
MCV RBC AUTO: 93.6 FL — SIGNIFICANT CHANGE UP (ref 80–100)
MONOCYTES # BLD AUTO: 0.5 K/UL — SIGNIFICANT CHANGE UP (ref 0–0.9)
MONOCYTES NFR BLD AUTO: 6.9 % — SIGNIFICANT CHANGE UP (ref 2–14)
NEUTROPHILS # BLD AUTO: 3.1 K/UL — SIGNIFICANT CHANGE UP (ref 1.8–7.4)
NEUTROPHILS NFR BLD AUTO: 39.9 % — LOW (ref 43–77)
PLATELET # BLD AUTO: 265 K/UL — SIGNIFICANT CHANGE UP (ref 150–400)
RBC # BLD: 5.33 M/UL — SIGNIFICANT CHANGE UP (ref 4.2–5.8)
RBC # FLD: 11.7 % — SIGNIFICANT CHANGE UP (ref 10.3–14.5)
WBC # BLD: 7.9 K/UL — SIGNIFICANT CHANGE UP (ref 3.8–10.5)
WBC # FLD AUTO: 7.9 K/UL — SIGNIFICANT CHANGE UP (ref 3.8–10.5)

## 2022-11-08 PROCEDURE — 99243 OFF/OP CNSLTJ NEW/EST LOW 30: CPT

## 2022-11-08 NOTE — ASSESSMENT
[FreeTextEntry1] : Mr. Ramirez is a 28 yo WM, currently active duty in [employer], stationed in [place], [place], being referred by Dr. Byrne for a new Eosinophilia.\par  Patient had a  CBC done in Sept. 2022 which showed a WBC-10.92, with ANC-6.3, no eosinophilia. On November 2, 2022 his WBC was 7.58, With Eos % of 19.8, Abs. Eos -1.5. . He denies any history of asthma, or allergies. He recently traveled to [place] in October , and had one episode of GI upset . He has not traveled outside of the  .\par  This is his first fall season up here in [place].\par He has chronic back and hip and  foot pain, has seen orthopedics and thought to be secondary to past trauma.\par Today his CBC shows WBC-7.9, HGb-16.2, HCT-49.9, Plat-265,000, ANC-3.1, ALC-2.6, Abs. Eos - 1.4, % Eos-18.3 %.\par  Will send off stool for GI PCR and Ova and parasites.\par For now, will repeat CBC in about 2-3 weeks to follow Eosinophilia.

## 2022-11-08 NOTE — REVIEW OF SYSTEMS
[Negative] : Allergic/Immunologic [FreeTextEntry6] : occasional tightness in his chest [FreeTextEntry9] : chronic back and hip and foot pain

## 2022-11-08 NOTE — HISTORY OF PRESENT ILLNESS
[de-identified] : Mr. Ramirez is a 26 yo WM, currently active duty in [employer], stationed in [place], [place], being referred by Dr. Byrne for a new Eosinophilia.\par  Patient had a  CBC done in Sept. 2022 which showed a WBC-10.92, with ANC-6.3, no eosinophilia. On November 2, 2022 his WBC was 7.58, With Eos % of 19.8, Abs. Eos -1.5. . He denies any history of asthma, or allergies. He recently traveled to [place] in October , and had one episode of GI upset . He has not traveled outside of the  .\par  This is his first fall season up here in [place].\par He has chronic back and hip and  foot pain, has seen orthopedics and thought to be secondary to past trauma. [de-identified] : He denies any fevers, chills or night sweats, No rashes, at times feels a tightness in his chest but no SOB, cough. Currently no GI complaints.\par  Only medication he is on is Meloxicam for pain and Multivitamins.

## 2022-11-15 ENCOUNTER — APPOINTMENT (OUTPATIENT)
Dept: ORTHOPEDIC SURGERY | Facility: CLINIC | Age: 27
End: 2022-11-15

## 2022-11-15 VITALS
HEART RATE: 58 BPM | WEIGHT: 221 LBS | DIASTOLIC BLOOD PRESSURE: 88 MMHG | SYSTOLIC BLOOD PRESSURE: 155 MMHG | BODY MASS INDEX: 28.36 KG/M2 | HEIGHT: 74 IN

## 2022-11-15 DIAGNOSIS — M43.00 SPONDYLOLYSIS, SITE UNSPECIFIED: ICD-10-CM

## 2022-11-15 PROCEDURE — 99214 OFFICE O/P EST MOD 30 MIN: CPT

## 2022-11-15 NOTE — DISCUSSION/SUMMARY
[Medication Risks Reviewed] : Medication risks reviewed [de-identified] : I offered patient's possible injection therapy with pain management since physical therapy is not working to relieve his pain to his satisfaction.  He declines injection therapy at this time.  He states he was offered injection therapy for his labral tears of the hip as well but declined that also.  He states he does not like taking medication if its not absolutely necessary which is understandable.  MRI of the lumbar spine is ordered is medically necessary to delineate why patient has pain of the back despite current active physical therapy 2 times to 3 times a week for the last 3 months since August 2022 without significant resolution of pain.  MRI will be used to guide treatment plan, injection therapy versus surgical intervention.

## 2022-11-15 NOTE — PHYSICAL EXAM
[de-identified] :  CONSTITUTIONAL: The patient is a very pleasant individual who is well-nourished and who appears stated age.\par PSYCHIATRIC: The patient is alert and oriented X 3 and in no apparent distress, and participates with orthopedic evaluation well.\par HEAD: Atraumatic and is nonsyndromic in appearance.\par EENT: No visible thyromegaly, EOMI.\par RESPIRATORY: Respiratory rate is regular, not dyspneic on examination.\par LYMPHATICS: There is no inguinal lymphadenopathy\par INTEGUMENTARY: Skin is clean, dry, and intact about the bilateral lower extremities and lumbar spine.\par VASCULAR: There is brisk capillary refill about the bilateral lower extremities.\par NEUROLOGIC: There are no pathologic reflexes. There is no decrease in sensation of the bilateral lower extremities on Wartenberg pinwheel examination. Deep tendon reflexes are well maintained at 2+/4 of the bilateral lower extremities and are symmetric..\par MUSCULOSKELETAL: There is no visible muscular atrophy. Manual motor strength is well maintained in the bilateral lower extremities. Range of motion of lumbar spine is well maintained. The patient ambulates in a non-myelopathic manner. Negative tension sign and straight leg raise bilaterally. Quad extension, ankle dorsiflexion, EHL, plantar flexion, and ankle eversion are well preserved. Normal secondary orthopaedic exam of bilateral hips, greater trochanteric area, knees and ankles, exam is consistent with lower lumbar myositis greater on the left than the right and mechanically orientated low back pain  [de-identified] : X-rays of the lumbar spine taken does show possible loss of a lumbar disc space at L4-L5 and L5 and S1. There is a well-maintained lordosis to this examiner September 2, 2022 2 views of the lumbar spine, of note there is a visible pars defect within the L4. \par \par  \par

## 2022-11-15 NOTE — HISTORY OF PRESENT ILLNESS
[de-identified] : 27-year-old gentleman currently in the [employer] presents for evaluation of low back pain, patient gives a very good history of multiple microtrauma events of the been leading up to now also a chronic low back pain issue he has been in physical therapy for approximately 3 months, 2-3 times per week for back and hip/groin pain.  pain is now located left lower back, buttock and groin.  \par \par He states the symptoms are stable. The patient mentions the symptoms started 1+ year(s) ago. Pain levels include a current pain level of 1/10 and a maximum pain level of 10/10. \par He states the symptoms seem to be intermittent. \par \par Modifying factors - worsened by bending and worsened by lifting. Relieving factors include relieved by nonsteroidal anti-inflammatory drugs, relieved by physical therapy and relieved by rest. \par Associated Symptoms: no ataxia, no incontinence, good dexterit

## 2022-11-28 ENCOUNTER — APPOINTMENT (OUTPATIENT)
Dept: MRI IMAGING | Facility: CLINIC | Age: 27
End: 2022-11-28

## 2022-11-28 ENCOUNTER — APPOINTMENT (OUTPATIENT)
Dept: ORTHOPEDIC SURGERY | Facility: CLINIC | Age: 27
End: 2022-11-28

## 2022-11-28 ENCOUNTER — OUTPATIENT (OUTPATIENT)
Dept: OUTPATIENT SERVICES | Facility: HOSPITAL | Age: 27
LOS: 1 days | End: 2022-11-28

## 2022-11-28 DIAGNOSIS — Q74.2 OTHER CONGENITAL MALFORMATIONS OF LOWER LIMB(S), INCLUDING PELVIC GIRDLE: ICD-10-CM

## 2022-11-28 DIAGNOSIS — M47.816 SPONDYLOSIS WITHOUT MYELOPATHY OR RADICULOPATHY, LUMBAR REGION: ICD-10-CM

## 2022-11-28 DIAGNOSIS — M79.672 PAIN IN RIGHT FOOT: ICD-10-CM

## 2022-11-28 DIAGNOSIS — M79.671 PAIN IN RIGHT FOOT: ICD-10-CM

## 2022-11-28 PROCEDURE — 99441: CPT

## 2022-11-28 PROCEDURE — 72148 MRI LUMBAR SPINE W/O DYE: CPT | Mod: 26

## 2022-11-29 ENCOUNTER — APPOINTMENT (OUTPATIENT)
Dept: HEMATOLOGY ONCOLOGY | Facility: CLINIC | Age: 27
End: 2022-11-29

## 2022-12-09 ENCOUNTER — APPOINTMENT (OUTPATIENT)
Dept: ORTHOPEDIC SURGERY | Facility: CLINIC | Age: 27
End: 2022-12-09

## 2022-12-09 ENCOUNTER — NON-APPOINTMENT (OUTPATIENT)
Age: 27
End: 2022-12-09

## 2022-12-09 VITALS
SYSTOLIC BLOOD PRESSURE: 155 MMHG | RESPIRATION RATE: 70 BRPM | WEIGHT: 221 LBS | DIASTOLIC BLOOD PRESSURE: 88 MMHG | HEIGHT: 74 IN | BODY MASS INDEX: 28.36 KG/M2

## 2022-12-09 PROCEDURE — 99214 OFFICE O/P EST MOD 30 MIN: CPT

## 2022-12-09 NOTE — HISTORY OF PRESENT ILLNESS
[de-identified] : Very pleasant 27-year-old gentleman presents for evaluation interpretation of his lumbar MRI pain is primarily activity and workout related intermittent exacerbations of pain.  His last exacerbation was doing ab workouts or hanging leg raises.  Pain last for few days and slowly improves.  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  Avid golfer however as the temperature changes at that aspect is going to be slowing down. [Stable] : stable [1] : a current pain level of 1/10 [10] : a maximum pain level of 10/10 [Intermit.] : ~He/She~ states the symptoms seem to be intermittent [Bending] : worsened by bending [Lifting] : worsened by lifting [NSAIDs] : relieved by nonsteroidal anti-inflammatory drugs [Rest] : relieved by rest [Ataxia] : no ataxia [Incontinence] : no incontinence [Loss of Dexterity] : good dexterity [Urinary Ret.] : no urinary retention [de-identified] : With rotation

## 2022-12-09 NOTE — DISCUSSION/SUMMARY
[de-identified] : I believe this is a multifactorial complaint with regard to myositis and lumbar degenerative disc disease.  We had a very lengthy conversation with modifying gym activity workout activities and golf etc.  I think trying an evaluation with physical medicine and rehabilitation would be beneficial.  I think would be beneficial to see how if he does get improvement with an epidural versus trigger point injections in order to determine the etiology or the pain generator.  Patient can follow-up on an as-needed basis.

## 2022-12-09 NOTE — PHYSICAL EXAM
[Normal] : Gait: normal [de-identified] : CONSTITUTIONAL: The patient is a very pleasant individual who is well-nourished and who appears stated age.\par PSYCHIATRIC: The patient is alert and oriented X 3 and in no apparent distress, and participates with orthopedic evaluation well.\par HEAD: Atraumatic and is nonsyndromic in appearance.\par EENT: No visible thyromegaly, EOMI.\par RESPIRATORY: Respiratory rate is regular, not dyspneic on examination.\par LYMPHATICS: There is no inguinal lymphadenopathy\par INTEGUMENTARY: Skin is clean, dry, and intact about the bilateral lower extremities and lumbar spine.\par VASCULAR: There is brisk capillary refill about the bilateral lower extremities.\par NEUROLOGIC: There are no pathologic reflexes. There is no decrease in sensation of the bilateral lower extremities on Wartenberg pinwheel examination. Deep tendon reflexes are well maintained at 2+/4 of the bilateral lower extremities and are symmetric..\par MUSCULOSKELETAL: There is no visible muscular atrophy. Manual motor strength is well maintained in the bilateral lower extremities. Range of motion of lumbar spine is well maintained. The patient ambulates in a non-myelopathic manner. Negative tension sign and straight leg raise bilaterally. Quad extension, ankle dorsiflexion, EHL, plantar flexion, and ankle eversion are well preserved. Normal secondary orthopaedic exam of bilateral hips, greater trochanteric area, knees and ankles.  Mechanically orientated low back pain, left greater than right paraspinal myositis [de-identified] : MRI from [facility] has been reviewed from November 2022 that shows mild 4 5 and 5 1 lumbar degenerative disc disease.

## 2022-12-12 ENCOUNTER — NON-APPOINTMENT (OUTPATIENT)
Age: 27
End: 2022-12-12

## 2022-12-12 LAB
BASOPHILS # BLD AUTO: 0.1 K/UL
BASOPHILS NFR BLD AUTO: 1.3 %
EOSINOPHIL # BLD AUTO: 1 K/UL
EOSINOPHIL NFR BLD AUTO: 13.2 %
HCT VFR BLD CALC: 44.6 %
HGB BLD-MCNC: 14.8 G/DL
IMM GRANULOCYTES NFR BLD AUTO: 0.1 %
LYMPHOCYTES # BLD AUTO: 2.48 K/UL
LYMPHOCYTES NFR BLD AUTO: 32.8 %
MAN DIFF?: NORMAL
MCHC RBC-ENTMCNC: 29.6 PG
MCHC RBC-ENTMCNC: 33.2 GM/DL
MCV RBC AUTO: 89.2 FL
MONOCYTES # BLD AUTO: 0.51 K/UL
MONOCYTES NFR BLD AUTO: 6.7 %
NEUTROPHILS # BLD AUTO: 3.46 K/UL
NEUTROPHILS NFR BLD AUTO: 45.9 %
PLATELET # BLD AUTO: 286 K/UL
RBC # BLD: 5 M/UL
RBC # FLD: 12.6 %
WBC # FLD AUTO: 7.56 K/UL

## 2022-12-15 ENCOUNTER — APPOINTMENT (OUTPATIENT)
Dept: PHYSICAL MEDICINE AND REHAB | Facility: CLINIC | Age: 27
End: 2022-12-15

## 2022-12-23 PROBLEM — M79.671 FOOT PAIN, BILATERAL: Status: ACTIVE | Noted: 2022-10-17

## 2023-01-05 ENCOUNTER — APPOINTMENT (OUTPATIENT)
Dept: FAMILY MEDICINE | Facility: CLINIC | Age: 28
End: 2023-01-05
Payer: OTHER GOVERNMENT

## 2023-01-05 VITALS
TEMPERATURE: 97.1 F | BODY MASS INDEX: 28.49 KG/M2 | DIASTOLIC BLOOD PRESSURE: 70 MMHG | OXYGEN SATURATION: 100 % | HEART RATE: 62 BPM | HEIGHT: 74 IN | SYSTOLIC BLOOD PRESSURE: 118 MMHG | WEIGHT: 222 LBS

## 2023-01-05 DIAGNOSIS — R79.9 ABNORMAL FINDING OF BLOOD CHEMISTRY, UNSPECIFIED: ICD-10-CM

## 2023-01-05 PROCEDURE — 99214 OFFICE O/P EST MOD 30 MIN: CPT | Mod: 25

## 2023-01-05 PROCEDURE — 36415 COLL VENOUS BLD VENIPUNCTURE: CPT

## 2023-01-05 RX ORDER — MELOXICAM 15 MG/1
15 TABLET ORAL
Qty: 21 | Refills: 0 | Status: DISCONTINUED | COMMUNITY
Start: 2022-12-15 | End: 2023-01-05

## 2023-01-05 RX ORDER — MELOXICAM 15 MG/1
15 TABLET ORAL
Qty: 14 | Refills: 2 | Status: DISCONTINUED | COMMUNITY
Start: 2022-10-17 | End: 2023-01-05

## 2023-01-05 NOTE — ASSESSMENT
[FreeTextEntry1] : HLD: Recommend low fat diet, wt loss, exercise, nutritional counseling provided, f/u lipid panel\par HTN: bp within acceptable range, diet/lifestyle controlled, recommend low salt diet, wt loss, exercise, DASH diet\par Eosinophilia: monitored by hematology\par RTC 4 wks

## 2023-01-05 NOTE — HISTORY OF PRESENT ILLNESS
[FreeTextEntry1] : Follow Up. [de-identified] : 28 yo male presents for follow up of HLD. reports feeling well. no acute complaints. no fever, chills, cp, palpitations, sob, nvcd. \par

## 2023-01-06 ENCOUNTER — APPOINTMENT (OUTPATIENT)
Dept: PHYSICAL MEDICINE AND REHAB | Facility: CLINIC | Age: 28
End: 2023-01-06
Payer: OTHER GOVERNMENT

## 2023-01-06 DIAGNOSIS — M79.10 MYALGIA, UNSPECIFIED SITE: ICD-10-CM

## 2023-01-06 DIAGNOSIS — M54.50 LOW BACK PAIN, UNSPECIFIED: ICD-10-CM

## 2023-01-06 DIAGNOSIS — M51.36 OTHER INTERVERTEBRAL DISC DEGENERATION, LUMBAR REGION: ICD-10-CM

## 2023-01-06 DIAGNOSIS — M79.18 MYALGIA, OTHER SITE: ICD-10-CM

## 2023-01-06 DIAGNOSIS — G89.29 LOW BACK PAIN, UNSPECIFIED: ICD-10-CM

## 2023-01-06 PROCEDURE — 20553 NJX 1/MLT TRIGGER POINTS 3/>: CPT

## 2023-01-06 PROCEDURE — 99204 OFFICE O/P NEW MOD 45 MIN: CPT | Mod: 25

## 2023-01-06 NOTE — PHYSICAL EXAM
[FreeTextEntry1] : PE:\par Constitutional: In NAD, calm and cooperative\par MSK (Back/L Hip)\par 	Inspection: no gross swelling identified\par 	Palpation: Minimal Tenderness of the L lower lumbar paraspinals, gluteus medius and gluteus antolin\par 	ROM: Pain at end lumbar flexion>extension but AROM intact\par 	Strength: 5/5 strength in bilateral lower extremities\par 	Reflexes: 2+ Patella reflex bilaterally, 2+ Achilles reflex bilaterally, negative clonus bilaterally\par 	Sensation: Intact to light touch in bilateral lower extremities\par Special tests:\par SLR:negative bilaterally\par MILLICENT:negative bilaterally\par FADIR: positive on left, negative on right\par Facet loading: negative bilaterally

## 2023-01-06 NOTE — ASSESSMENT
[FreeTextEntry1] : Mr. YUE PEREZ is a 27 year old male who presents with chronic L hip and low back pain, likely multifactorial, possibly due to underlying DDD, myofascial based pain as well as L hip impingement syndrome. Denies any red flag signs. Will recommend:\par - MRI L Spine reviewed by me. Ortho Sports notes reviewed - candidate for surgery. Ortho Spine notes reviewed - not a lumbar surgical candidate at this time. \par - TPI performed today, tolerated well\par - Patient to see my colleague Dr. Abdi for his opinion, possibly for L intrarticular hip diagnostic block. \par - As I do not think his lumbar spine is a significant pain generator at this time, would not recommend an HANNAH at this time. \par \par RTC as needed. Patient aware of red flag signs including any changes to their bowel/bladder control, groin numbness or new weakness. Patient knows to seek immediate attention by calling 911 or going to nearest ER if these symptoms appear.

## 2023-01-06 NOTE — PROCEDURE
[de-identified] : Reason for procedure: Myalgia\par \par Procedure: Trigger Point Injections\par Physician: Dr. Hidalgo\par Medication injected: Lidocaine 1%, 5cc total\par Sedation medications: None\par Estimated blood loss: None\par Complications: None\par \par Technique: R/B/A to trigger point injection reviewed with patient. The patient is agreeable and wishes to proceed.   The patient was placed in prone position and trigger points of left glueteus medius, gluteus antolin and lumbar paraspinals were identified. The area was prepped in normal sterile fashion with Chloroprep. A 27 gauge 1.25 inch needle was advanced into the palpable trigger points with reproduction of pain. After negative aspiration of heme, the above medications were injected into the trigger areas. Needle was then removed, bandaid placed over injection sites. There was no complications, the patient was provided with post injection instructions.

## 2023-01-06 NOTE — DATA REVIEWED
[FreeTextEntry1] : MR L Spine 11/28/22 reviewed by me: minimal DDD, no canal stenosis seen\par \par   MR Lumbar Spine No Cont             Final\par \par No Documents Attached\par \par \par \par \par   EXAM: [id number] - MR SPINE LUMBAR  - ORDERED BY: JENARO VELEZ\par \par \par PROCEDURE DATE:  11/28/2022\par \par \par \par INTERPRETATION:  CLINICAL INFORMATION: Lower back pain despite physical therapy\par \par ADDITIONAL CLINICAL INFORMATION: Spondylolisthesis M43.16\par \par TECHNIQUE: Multiplanar, multisequence MRI was performed of the lumbar spine.\par IV Contrast: NONE\par \par PRIOR STUDIES: Outside PACs radiographs from 9/2/2022\par \par FINDINGS:\par \par LOCALIZER: No additional findings.\par BONES: There is no fracture or bone marrow edema.\par ALIGNMENT: Straightening of the normal lumbar lordosis. Mild retrolisthesis of L4 on L5 and L5 on S1 on a degenerative basis. No definitive pars defect are seen.\par SACROILIAC JOINTS/SACRUM: There is no sacral fracture. The SI joints are partially visualized but are intact.\par CONUS AND CAUDA EQUINA: The distal cord and conus are normal in signal. Conus terminates at L1.\par VISUALIZED INTRAPELVIC/INTRA-ABDOMINAL SOFT TISSUES: Normal.\par PARASPINAL SOFT TISSUES: Normal.\par \par INDIVIDUAL LEVELS:\par Mild disc desiccation at L4-5 and L5/S1.\par \par LOWER THORACIC SPINE: No spinal canal or neuroforaminal stenosis.\par \par L1-L2: No disc bulge. Mild bilateral facet hypertrophy. No spinal canal or neuroforaminal stenosis.\par L2-L3: No disc bulge. Mild bilateral facet hypertrophy. No spinal canal or neuroforaminal stenosis.\par L3-L4: Mild disc bulge with mild bilateral facet hypertrophy. No spinal canal or neuroforaminal stenosis.\par L4-L5: Mild disc bulge with mild bilateral facet hypertrophy causing minimal bilateral foraminal narrowing. No spinal canal narrowing.\par L5-S1: Diffuse disc bulge with mild bilateral facet hypertrophy. No foraminal or spinal canal narrowing.\par \par \par IMPRESSION:\par \par 1.  Minimal degenerative changes of the lumbar spine. No central canal narrowing.\par 2.  No definitive pars defects are visualized.\par \par --- End of Report ---\par \par \par \par \par \par \par CLEVELAND QUEZADA DO; Attending Radiologist\par This document has been electronically signed. Nov 29 2022  1:28PM\par \par  \par \par  Ordered by: JENARO VELEZ       Collected/Examined: 28Nov2022 05:33PM       \par Verified by: JENARO VELEZ 29Nov2022 04:35PM       \par  Result Communication: No patient communication needed at this time;\par Stage: Final       \par  Performed at: [facility], [place]       Resulted: 29Nov2022 12:18PM       Last Updated: 29Nov2022 04:35PM       Accession: [id number]

## 2023-01-06 NOTE — HISTORY OF PRESENT ILLNESS
[FreeTextEntry1] : Mr. YUE PEREZ is a 27 year old male who presents with low back pain. \par \par Location:Low back/buttock region, originally both sides but now all left\par Onset:Chronic, has always been very active, worsening in 8/2021 was doing a lot of athletic training. Has been constant since then. Of note, had a fx at L4-5 in 2011 as per patient but never had significant chronic pain from it. Had a fall on back during the summer 2022, for which he had bad spasms. He had additional episodes of back spasms since then. \par Provocation/Palliative:Worse with activity, better with rest\par Quality:Achy, sharp\par Radiation:None\par Severity:Mild to moderate\par Timing:Not improving greatly with time\par \par Denies any associated numbness. Denies any associated leg weakness. Denies any loss of bowel/bladder control or any groin numbness.\par Previous medications trialed:Medrol with relief but temporary, NSAIDs with some relief\par Previous procedures relevant to complaint:None\par Conservative therapy tried?:Patient very active with HEP\par  \par Patient is currently in the [employer]

## 2023-01-09 ENCOUNTER — APPOINTMENT (OUTPATIENT)
Dept: PHYSICAL MEDICINE AND REHAB | Facility: CLINIC | Age: 28
End: 2023-01-09

## 2023-01-11 LAB
CHOLEST SERPL-MCNC: 250 MG/DL
HDLC SERPL-MCNC: 44 MG/DL
LDLC SERPL CALC-MCNC: 176 MG/DL
NONHDLC SERPL-MCNC: 207 MG/DL
TRIGL SERPL-MCNC: 152 MG/DL

## 2023-01-14 ENCOUNTER — APPOINTMENT (OUTPATIENT)
Dept: PHYSICAL MEDICINE AND REHAB | Facility: CLINIC | Age: 28
End: 2023-01-14

## 2023-02-02 ENCOUNTER — APPOINTMENT (OUTPATIENT)
Dept: PHYSICAL MEDICINE AND REHAB | Facility: CLINIC | Age: 28
End: 2023-02-02
Payer: OTHER GOVERNMENT

## 2023-02-02 VITALS
DIASTOLIC BLOOD PRESSURE: 78 MMHG | RESPIRATION RATE: 14 BRPM | BODY MASS INDEX: 27.59 KG/M2 | WEIGHT: 215 LBS | HEIGHT: 74 IN | HEART RATE: 49 BPM | SYSTOLIC BLOOD PRESSURE: 134 MMHG

## 2023-02-02 PROCEDURE — 99214 OFFICE O/P EST MOD 30 MIN: CPT

## 2023-02-02 NOTE — HISTORY OF PRESENT ILLNESS
[FreeTextEntry1] : 27 y.o. M active [employer] presents to office w/ c/o LBP  after training for triathlon in July - August 2021.  Pt. reported developing left and right sided LBP then settled to right and for last 8 months has been predominantly left sided.  Concurrently, pt. began to notice b/l groin pain.  He endorses b/l labral tears and hip impingement diagnosed by MRI (pt. saw Dr. Jay).  Results detailed below.  Denies N/T/B.  Pt. endorses some lateral hip discomfort and proximal hamstring pain.  Of note, pt. reports sustaining L4 & L5 VCFs (2012) which healed conservatively with P.T. w/o further issue.  Most recently, pt. has been in P.T. for 6 months now which mixed results.  He states that he has not worked on his hip issues recently.

## 2023-02-02 NOTE — ASSESSMENT
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  I spent most of today's office visit (45 minutes) reviewing the patient's relevant imaging studies, discussing etiology, pathogenesis, further diagnostic work-up and nonoperative versus operative management.  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  Of note, the patient does have right greater than left CAM lesions on his MRI which was not called by the radiologist.  His lateral central edge angle is 35 degrees on either side which is nearing impingement range.  We discussed the risks, benefits and alternatives to staged, bilateral, ultrasound guided local anesthetic blocks of the hip joints.  I explained that if the patient experiences greater then 75 to 80% symptomatic relief of his hip, groin and lower back pain on either side he may then consider PRP injections versus hip arthroscopy with Dr. Jay.  I recommended against lumbar medial branch blocks given the patient's history of present illness, physical examination and lumbar MRI.  We reviewed a proper home exercise program.  I recommended the judicious use of p.o. NSAIDs.  The patient is in agreement with the plan.  All questions have been answered.  Return to office for ultrasound-guided local anesthetic blocks of the hip.

## 2023-02-02 NOTE — PHYSICAL EXAM
[FreeTextEntry1] : NAD\par A&Ox3\par Non-obese\par ROM L-spine: near full forward flexion w/o pain; 35-40' passive extension w/o pain\par ROM Hips: snooth IR/ER\par Pelvic tilt: +\par Seated slump test: neg b/l\par SLR: neg b/l\par MILLICENT's: + left (in extreme ABER)\par FADIR's: + b/l (groin)\par DTR's: 2+ knees/ankles\par MMT: 5/5 b/l LE\par Sensation: SILT\par Toe & Heel Walk: Yes\par  \par

## 2023-02-02 NOTE — DATA REVIEWED
[MRI] : MRI [FreeTextEntry1] : MRI L-spine (Nov 2022): L1-L2: No disc bulge. Mild bilateral facet hypertrophy. No spinal canal or neuroforaminal stenosis.  L2-L3: No disc bulge. Mild bilateral facet hypertrophy. No spinal canal or neuroforaminal stenosis.  L3-L4: Mild disc bulge with mild bilateral facet hypertrophy. No spinal canal or neuroforaminal stenosis.  L4-L5: Mild disc bulge with mild bilateral facet hypertrophy causing minimal bilateral foraminal narrowing. No spinal canal narrowing.\par L5-S1: Diffuse disc bulge with mild bilateral facet hypertrophy. No foraminal or spinal canal narrowing.\par \par MRI Left Hip: Partial-thickness nondisplaced tear of the anterosuperior left acetabular labrum.  Mild tendinosis of the hamstring tendon origin.   \par \par MRI Right Hip: Full-thickness nondisplaced tear of the anterosuperior right acetabular labrum. Posterior superior labral sulcus.  Mild to moderate tendinosis of the hamstring tendon origin.   \par \par Bilateral AP, frog leg, Weiss views and false profile views (Dr. Jay office visit Sept. 2022): They demonstrate femoroacetabular impingement of the bilateral hips. Alpha angle is 54 on left and 52 on right. Lateral center edge angle is 35 on right and 35 on left. Anterior center edge angle is 42 on left and 32 on right. \par

## 2023-02-03 ENCOUNTER — TRANSCRIPTION ENCOUNTER (OUTPATIENT)
Age: 28
End: 2023-02-03

## 2023-02-03 ENCOUNTER — APPOINTMENT (OUTPATIENT)
Dept: PHYSICAL MEDICINE AND REHAB | Facility: CLINIC | Age: 28
End: 2023-02-03
Payer: OTHER GOVERNMENT

## 2023-02-03 VITALS
SYSTOLIC BLOOD PRESSURE: 158 MMHG | DIASTOLIC BLOOD PRESSURE: 77 MMHG | RESPIRATION RATE: 14 BRPM | BODY MASS INDEX: 27.59 KG/M2 | WEIGHT: 215 LBS | HEART RATE: 59 BPM | HEIGHT: 74 IN

## 2023-02-03 PROCEDURE — 20611 DRAIN/INJ JOINT/BURSA W/US: CPT | Mod: RT

## 2023-02-03 NOTE — PROCEDURE
[de-identified] : Reason for procedure: RIGHT HIP PAIN\par \par Procedure: US GUIDED RIGHT HIP LOCAL ANESTHETIC BLOCK\par Physician: JANEL NICOLE DO\par Medication injected: 8 cc 2% LIDOCAINE (total)\par Sedation medications: None\par Estimated blood loss: None\par Complications: None\par \par Technique:  R/B/A to USG RIGHT hip joint injection reviewed with patient. The patient is agreeable and wishes to proceed.  Signed consent form to be scanned into EMR.  The patient was placed in supine position. Utilizing ultrasound, the femoral neck, femoroacetabular junction, overlying vessels and muscles were identified. The area was prepped in normal sterile fashion with Chloroprep x3.  Local anesthesia with 1% lidocaine was provided with 25-G, 1.5" needle.  After adequate anesthesia was obtained, a 22-G, 3.5" spinal needle was advanced under sonographic guidance toward the femoral neck.  After negative aspiration of heme, the above medication was injected into the joint space under direct US visualization.  The needle was then removed.  A band aid placed over injection site.  There was no complications.  Post injection instructions provided.  The patient noted improvement in pain and ROM after injection. \par \par

## 2023-02-07 ENCOUNTER — APPOINTMENT (OUTPATIENT)
Dept: PHYSICAL MEDICINE AND REHAB | Facility: CLINIC | Age: 28
End: 2023-02-07

## 2023-02-07 ENCOUNTER — APPOINTMENT (OUTPATIENT)
Dept: CARDIOLOGY | Facility: CLINIC | Age: 28
End: 2023-02-07

## 2023-02-08 ENCOUNTER — APPOINTMENT (OUTPATIENT)
Dept: PHYSICAL MEDICINE AND REHAB | Facility: CLINIC | Age: 28
End: 2023-02-08

## 2023-02-09 ENCOUNTER — APPOINTMENT (OUTPATIENT)
Dept: PHYSICAL MEDICINE AND REHAB | Facility: CLINIC | Age: 28
End: 2023-02-09

## 2023-02-09 ENCOUNTER — APPOINTMENT (OUTPATIENT)
Dept: PHYSICAL MEDICINE AND REHAB | Facility: CLINIC | Age: 28
End: 2023-02-09
Payer: OTHER GOVERNMENT

## 2023-02-09 DIAGNOSIS — M47.816 SPONDYLOSIS W/OUT MYELOPATHY OR RADICULOPATHY, LUMBAR REGION: ICD-10-CM

## 2023-02-09 PROCEDURE — 99214 OFFICE O/P EST MOD 30 MIN: CPT | Mod: 95

## 2023-02-09 NOTE — REASON FOR VISIT
[Home] : at home, [unfilled] , at the time of the visit. [Medical Office: ([facility])___] : at the medical office located in  [Follow-Up] : a follow-up visit

## 2023-02-09 NOTE — ASSESSMENT
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  I spent most of today's telehealth visit (25 minutes) re-reviewing the patient's relevant imaging studies, US guided LA block R hip, discussing etiology, pathogenesis, further diagnostic work-up and nonoperative versus operative management.  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  Of note, the patient does have right greater than left CAM lesions on his MRI which was not called by the radiologist.  His lateral central edge angle is 35 degrees on either side which is nearing impingement range.  The patient reported excellent pain relief status post ultrasound-guided right hip local anesthetic block.  As such, he may wish to consider a PRP injection into the right hip rather than proceed with a hip arthroscopy.  We further discussed repeating the local anesthetic block of the patient's left hip to determine if his left-sided lumbosacral back pain is referred from the large joint.  The patient has a nondiagnostic MRI scan of his lumbar spine and has excellent lumbar spinal extension without reproduction of said pain.  The patient wishes to proceed with the second local anesthetic block and will be scheduled accordingly.  As previously discussed, I had recommended against lumbar medial branch blocks given the patient's history of present illness, physical examination and lumbar MRI.  We reviewed a proper home exercise program.  I recommended the judicious use of p.o. NSAIDs.  The patient is in agreement with the plan.  All questions have been answered.  Return to office for ultrasound-guided local anesthetic block left hip.

## 2023-02-09 NOTE — HISTORY OF PRESENT ILLNESS
[FreeTextEntry1] : 27-year-old active [employer] male with chronic bilateral hip and lower back pain for telehealth f/u s/p US guided R hip LA block (2/3/23).  Pt. reports excellent pain relief for first few hours post procedure.  During the time he was at the gym he was able to perform his exercises w/o his usual pain.  Pain returned the following day.  Still has left sided LBP.

## 2023-02-16 ENCOUNTER — APPOINTMENT (OUTPATIENT)
Dept: ORTHOPEDIC SURGERY | Facility: CLINIC | Age: 28
End: 2023-02-16

## 2023-02-23 ENCOUNTER — APPOINTMENT (OUTPATIENT)
Dept: ORTHOPEDIC SURGERY | Facility: CLINIC | Age: 28
End: 2023-02-23
Payer: OTHER GOVERNMENT

## 2023-02-23 ENCOUNTER — APPOINTMENT (OUTPATIENT)
Dept: PHYSICAL MEDICINE AND REHAB | Facility: CLINIC | Age: 28
End: 2023-02-23

## 2023-02-23 DIAGNOSIS — M25.851 OTHER SPECIFIED JOINT DISORDERS, RIGHT HIP: ICD-10-CM

## 2023-02-23 PROCEDURE — 99214 OFFICE O/P EST MOD 30 MIN: CPT

## 2023-02-26 NOTE — PHYSICAL EXAM
[de-identified] : Physical Exam: \par General: Well appearing, no acute distress \par Neurologic: A&Ox3, No focal deficits \par Head: NCAT without abrasions, lacerations, or ecchymosis to head, face, or scalp \par Eyes: No scleral icterus, no gross abnormalities \par Respiratory: Equal chest wall expansion bilaterally, no accessory muscle use \par Lymphatic: No lymphadenopathy palpated \par Skin: Warm and dry \par Psychiatric: Normal mood and affect\par \par Examination of the Right hip reveals no obvious deformity or leg length discrepancy. There is no swelling noted. The patient is nontender to palpation over the greater trochanter, groin, and IT band. The patients range of motion is to 110 degrees of hip flexion, 40 degrees of abduction, 20 degrees of adduction, 15 degrees of internal rotation and 35 degrees of external rotation. The patient has 5/5 strength to resisted hip flexion, abduction and adduction. The patient has a negative MILLICENT and positive FADIR Test. Positve Nicole Test. Positive resisted SLR test at 30 degrees of hip flexion (Stinchfield). Negative Piriformis Test. No SI joint instability. There is no pain with logrolling. The calf and thigh are soft and nontender bilaterally. The patient is grossly neurovascularly intact distally.\par \par Examination of the Left hip reveals no obvious deformity or leg length discrepancy. There is no swelling noted. The patient is nontender to palpation over the greater trochanter, groin, and IT band. The patients range of motion is to 110 degrees of hip flexion, 40 degrees of abduction, 20 degrees of adduction, 15 degrees of internal rotation and 35 degrees of external rotation. The patient has 5/5 strength to resisted hip flexion, abduction and adduction. The patient has a negative MILLICENT and positive FADIR Test. Positve Nicole Test. Positive resisted SLR test at 30 degrees of hip flexion (Stinchfield). Negative Piriformis Test. No SI joint instability. There is no pain with logrolling. The calf and thigh are soft and nontender bilaterally. The patient is grossly neurovascularly intact distally.\par \par

## 2023-02-26 NOTE — HISTORY OF PRESENT ILLNESS
[de-identified] : YUE is a 27 year male here today for f/u bilateral hip pain. He saw Dr. Hidalgo and tried trigger point injections of the lower back. He notes it worked for 2 days and his pain came back. He saw Dr. Abdi and tried LA block injections of the right hip with no relief. They also discussed possible PRP injections of the hip. He has been going to PT.

## 2023-02-26 NOTE — ADDENDUM
[FreeTextEntry1] : Documented by Paulina Mann acting as a scribe for Dr. Jay and Juan Carlos Osborne PA-C on 02/23/2023.   All medical record entries made by the Scribe were at my, Dr. Jay, and Juan Carlos Osborne's, direction and personally dictated by me on 02/23/2023. I have reviewed the chart and agree that the record accurately reflects my personal performance of the history, physical exam, procedure and imaging.

## 2023-02-26 NOTE — DISCUSSION/SUMMARY
[de-identified] : I had a lengthy discussion with the patient regarding their current condition. We discussed the treatment options. I have recommended him to follow up with Dr. Abdi for possible PRP injections. He will follow up with me on an as needed basis. All questions were answered and the patient verbalized understanding. The patient is in agreement with this treatment plan.

## 2023-03-28 ENCOUNTER — APPOINTMENT (OUTPATIENT)
Dept: PHYSICAL MEDICINE AND REHAB | Facility: CLINIC | Age: 28
End: 2023-03-28

## 2023-05-10 ENCOUNTER — NON-APPOINTMENT (OUTPATIENT)
Age: 28
End: 2023-05-10

## 2023-05-10 ENCOUNTER — APPOINTMENT (OUTPATIENT)
Dept: CARDIOLOGY | Facility: CLINIC | Age: 28
End: 2023-05-10
Payer: OTHER GOVERNMENT

## 2023-05-10 VITALS
RESPIRATION RATE: 15 BRPM | WEIGHT: 225 LBS | SYSTOLIC BLOOD PRESSURE: 138 MMHG | BODY MASS INDEX: 28.88 KG/M2 | OXYGEN SATURATION: 98 % | HEIGHT: 74 IN | DIASTOLIC BLOOD PRESSURE: 80 MMHG | HEART RATE: 55 BPM

## 2023-05-10 PROCEDURE — 93000 ELECTROCARDIOGRAM COMPLETE: CPT

## 2023-05-10 PROCEDURE — 99204 OFFICE O/P NEW MOD 45 MIN: CPT

## 2023-05-10 NOTE — DISCUSSION/SUMMARY
[EKG obtained to assist in diagnosis and management of assessed problem(s)] : EKG obtained to assist in diagnosis and management of assessed problem(s) [FreeTextEntry1] : Patient is a 27yo M with HTN, HLD here for cardiac evaluation. \par -ECG and exam unremarkable\par -Does have significant dyslipidemia, BP high in office as well. \par -Recommend diet/lifestyle modifications at this time\par \par 1. Continue regular exercise as he is doing\par 2. Cut back salt and red meat, overall diet fairly good however\par 3. Recheck lipids\par 4. Followup 6 months

## 2023-05-10 NOTE — PHYSICAL EXAM
[Normal Gait] : normal gait [Normal] : no edema, no cyanosis, no clubbing, no varicosities [Moves all extremities] : moves all extremities [Alert and Oriented] : alert and oriented

## 2023-05-10 NOTE — HISTORY OF PRESENT ILLNESS
[FreeTextEntry1] : Patient is a 27yo M with HTN, HLD here for cardiac evaluation. Patient has been doing well without any chest pain or shortness of breath. Patient denies PND/orthopnea/edema/palpitations/syncope/claudication. Rarely feels cant catch full breath. Exercises about 6 days per week when able. \par \par \par Works in [employer], originally from [place]. \par \par ROS: GI negative, all others negative

## 2023-11-21 ENCOUNTER — APPOINTMENT (OUTPATIENT)
Dept: CARDIOLOGY | Facility: CLINIC | Age: 28
End: 2023-11-21
Payer: OTHER GOVERNMENT

## 2023-11-21 ENCOUNTER — NON-APPOINTMENT (OUTPATIENT)
Age: 28
End: 2023-11-21

## 2023-11-21 VITALS
DIASTOLIC BLOOD PRESSURE: 76 MMHG | HEART RATE: 45 BPM | WEIGHT: 225 LBS | HEIGHT: 74 IN | BODY MASS INDEX: 28.88 KG/M2 | RESPIRATION RATE: 15 BRPM | SYSTOLIC BLOOD PRESSURE: 130 MMHG | OXYGEN SATURATION: 99 %

## 2023-11-21 PROCEDURE — 93000 ELECTROCARDIOGRAM COMPLETE: CPT

## 2023-11-21 PROCEDURE — 99214 OFFICE O/P EST MOD 30 MIN: CPT

## 2023-11-22 ENCOUNTER — APPOINTMENT (OUTPATIENT)
Dept: FAMILY MEDICINE | Facility: CLINIC | Age: 28
End: 2023-11-22
Payer: OTHER GOVERNMENT

## 2023-11-22 VITALS
SYSTOLIC BLOOD PRESSURE: 122 MMHG | BODY MASS INDEX: 29 KG/M2 | HEART RATE: 78 BPM | DIASTOLIC BLOOD PRESSURE: 70 MMHG | OXYGEN SATURATION: 100 % | WEIGHT: 226 LBS | TEMPERATURE: 97.7 F | HEIGHT: 74 IN

## 2023-11-22 DIAGNOSIS — M25.852 OTHER SPECIFIED JOINT DISORDERS, LEFT HIP: ICD-10-CM

## 2023-11-22 DIAGNOSIS — R93.89 ABNORMAL FINDINGS ON DIAGNOSTIC IMAGING OF OTHER SPECIFIED BODY STRUCTURES: ICD-10-CM

## 2023-11-22 DIAGNOSIS — Z00.00 ENCOUNTER FOR GENERAL ADULT MEDICAL EXAMINATION W/OUT ABNORMAL FINDINGS: ICD-10-CM

## 2023-11-22 DIAGNOSIS — Z13.1 ENCOUNTER FOR SCREENING FOR DIABETES MELLITUS: ICD-10-CM

## 2023-11-22 DIAGNOSIS — M24.159 OTHER ARTICULAR CARTILAGE DISORDERS, UNSPECIFIED HIP: ICD-10-CM

## 2023-11-22 DIAGNOSIS — Z13.220 ENCOUNTER FOR SCREENING FOR LIPOID DISORDERS: ICD-10-CM

## 2023-11-22 DIAGNOSIS — Z13.29 ENCOUNTER FOR SCREENING FOR OTHER SUSPECTED ENDOCRINE DISORDER: ICD-10-CM

## 2023-11-22 DIAGNOSIS — E78.2 MIXED HYPERLIPIDEMIA: ICD-10-CM

## 2023-11-22 DIAGNOSIS — R00.1 BRADYCARDIA, UNSPECIFIED: ICD-10-CM

## 2023-11-22 DIAGNOSIS — I10 ESSENTIAL (PRIMARY) HYPERTENSION: ICD-10-CM

## 2023-11-22 DIAGNOSIS — D72.10 EOSINOPHILIA, UNSPECIFIED: ICD-10-CM

## 2023-11-22 PROCEDURE — 99395 PREV VISIT EST AGE 18-39: CPT | Mod: 25

## 2023-11-22 PROCEDURE — G0444 DEPRESSION SCREEN ANNUAL: CPT | Mod: 59

## 2023-11-27 LAB
25(OH)D3 SERPL-MCNC: 33.4 NG/ML
ALBUMIN SERPL ELPH-MCNC: 4.8 G/DL
ALP BLD-CCNC: 44 U/L
ALT SERPL-CCNC: 36 U/L
ANION GAP SERPL CALC-SCNC: 13 MMOL/L
APPEARANCE: CLEAR
AST SERPL-CCNC: 31 U/L
BACTERIA: NEGATIVE /HPF
BASOPHILS # BLD AUTO: 0.07 K/UL
BASOPHILS NFR BLD AUTO: 1.1 %
BILIRUB SERPL-MCNC: 0.6 MG/DL
BILIRUBIN URINE: NEGATIVE
BLOOD URINE: NEGATIVE
BUN SERPL-MCNC: 18 MG/DL
CALCIUM SERPL-MCNC: 10 MG/DL
CAST: 0 /LPF
CHLORIDE SERPL-SCNC: 103 MMOL/L
CHOLEST SERPL-MCNC: 200 MG/DL
CO2 SERPL-SCNC: 25 MMOL/L
COLOR: YELLOW
CREAT SERPL-MCNC: 1.26 MG/DL
EGFR: 81 ML/MIN/1.73M2
EOSINOPHIL # BLD AUTO: 0.89 K/UL
EOSINOPHIL NFR BLD AUTO: 13.7 %
EPITHELIAL CELLS: 0 /HPF
ESTIMATED AVERAGE GLUCOSE: 105 MG/DL
GLUCOSE QUALITATIVE U: NEGATIVE MG/DL
GLUCOSE SERPL-MCNC: 97 MG/DL
HBA1C MFR BLD HPLC: 5.3 %
HCT VFR BLD CALC: 47.5 %
HDLC SERPL-MCNC: 43 MG/DL
HGB BLD-MCNC: 15.3 G/DL
IMM GRANULOCYTES NFR BLD AUTO: 0.2 %
KETONES URINE: NEGATIVE MG/DL
LDLC SERPL CALC-MCNC: 140 MG/DL
LEUKOCYTE ESTERASE URINE: NEGATIVE
LYMPHOCYTES # BLD AUTO: 2.75 K/UL
LYMPHOCYTES NFR BLD AUTO: 42.2 %
MAGNESIUM SERPL-MCNC: 2 MG/DL
MAN DIFF?: NORMAL
MCHC RBC-ENTMCNC: 29.1 PG
MCHC RBC-ENTMCNC: 32.2 GM/DL
MCV RBC AUTO: 90.5 FL
MICROSCOPIC-UA: NORMAL
MONOCYTES # BLD AUTO: 0.41 K/UL
MONOCYTES NFR BLD AUTO: 6.3 %
NEUTROPHILS # BLD AUTO: 2.39 K/UL
NEUTROPHILS NFR BLD AUTO: 36.5 %
NITRITE URINE: NEGATIVE
NONHDLC SERPL-MCNC: 157 MG/DL
PH URINE: 7.5
PLATELET # BLD AUTO: 293 K/UL
POTASSIUM SERPL-SCNC: 4.8 MMOL/L
PROT SERPL-MCNC: 7 G/DL
PROTEIN URINE: NEGATIVE MG/DL
RBC # BLD: 5.25 M/UL
RBC # FLD: 13.1 %
RED BLOOD CELLS URINE: 0 /HPF
SODIUM SERPL-SCNC: 141 MMOL/L
SPECIFIC GRAVITY URINE: 1.02
TRIGL SERPL-MCNC: 94 MG/DL
TSH SERPL-ACNC: 0.74 UIU/ML
UROBILINOGEN URINE: 0.2 MG/DL
WBC # FLD AUTO: 6.52 K/UL
WHITE BLOOD CELLS URINE: 0 /HPF

## 2024-10-31 ENCOUNTER — RESULT REVIEW (OUTPATIENT)
Age: 29
End: 2024-10-31

## 2024-10-31 ENCOUNTER — APPOINTMENT (OUTPATIENT)
Dept: FAMILY MEDICINE | Facility: CLINIC | Age: 29
End: 2024-10-31
Payer: OTHER GOVERNMENT

## 2024-10-31 ENCOUNTER — LABORATORY RESULT (OUTPATIENT)
Age: 29
End: 2024-10-31

## 2024-10-31 ENCOUNTER — APPOINTMENT (OUTPATIENT)
Dept: RADIOLOGY | Facility: CLINIC | Age: 29
End: 2024-10-31
Payer: OTHER GOVERNMENT

## 2024-10-31 ENCOUNTER — APPOINTMENT (OUTPATIENT)
Dept: ULTRASOUND IMAGING | Facility: CLINIC | Age: 29
End: 2024-10-31
Payer: OTHER GOVERNMENT

## 2024-10-31 VITALS
HEIGHT: 74 IN | BODY MASS INDEX: 28.88 KG/M2 | DIASTOLIC BLOOD PRESSURE: 78 MMHG | HEART RATE: 72 BPM | WEIGHT: 225 LBS | SYSTOLIC BLOOD PRESSURE: 122 MMHG | OXYGEN SATURATION: 98 %

## 2024-10-31 DIAGNOSIS — E78.2 MIXED HYPERLIPIDEMIA: ICD-10-CM

## 2024-10-31 DIAGNOSIS — R22.40 LOCALIZED SWELLING, MASS AND LUMP, UNSPECIFIED LOWER LIMB: ICD-10-CM

## 2024-10-31 DIAGNOSIS — R53.83 OTHER FATIGUE: ICD-10-CM

## 2024-10-31 DIAGNOSIS — R09.1 PLEURISY: ICD-10-CM

## 2024-10-31 DIAGNOSIS — R00.1 BRADYCARDIA, UNSPECIFIED: ICD-10-CM

## 2024-10-31 DIAGNOSIS — Z86.2 PERSONAL HISTORY OF DISEASES OF THE BLOOD AND BLOOD-FORMING ORGANS AND CERTAIN DISORDERS INVOLVING THE IMMUNE MECHANISM: ICD-10-CM

## 2024-10-31 DIAGNOSIS — D72.10 EOSINOPHILIA, UNSPECIFIED: ICD-10-CM

## 2024-10-31 DIAGNOSIS — I10 ESSENTIAL (PRIMARY) HYPERTENSION: ICD-10-CM

## 2024-10-31 PROCEDURE — 99214 OFFICE O/P EST MOD 30 MIN: CPT

## 2024-10-31 PROCEDURE — 71046 X-RAY EXAM CHEST 2 VIEWS: CPT

## 2024-10-31 PROCEDURE — 76882 US LMTD JT/FCL EVL NVASC XTR: CPT | Mod: RT

## 2024-11-01 LAB
ALBUMIN SERPL ELPH-MCNC: 4.6 G/DL
ALP BLD-CCNC: 46 U/L
ALT SERPL-CCNC: 32 U/L
ANION GAP SERPL CALC-SCNC: 15 MMOL/L
APPEARANCE: CLEAR
AST SERPL-CCNC: 30 U/L
BACTERIA: NEGATIVE /HPF
BASOPHILS # BLD AUTO: 0.08 K/UL
BASOPHILS NFR BLD AUTO: 1.3 %
BILIRUB SERPL-MCNC: 0.2 MG/DL
BILIRUBIN URINE: NEGATIVE
BLOOD URINE: NEGATIVE
BUN SERPL-MCNC: 17 MG/DL
CALCIUM SERPL-MCNC: 9.5 MG/DL
CAST: 0 /LPF
CHLORIDE SERPL-SCNC: 104 MMOL/L
CHOLEST SERPL-MCNC: 123 MG/DL
CO2 SERPL-SCNC: 22 MMOL/L
COLOR: YELLOW
CREAT SERPL-MCNC: 1.23 MG/DL
EGFR: 83 ML/MIN/1.73M2
EOSINOPHIL # BLD AUTO: 0.72 K/UL
EOSINOPHIL NFR BLD AUTO: 11.7 %
EPITHELIAL CELLS: 0 /HPF
ESTIMATED AVERAGE GLUCOSE: 103 MG/DL
GLUCOSE QUALITATIVE U: NEGATIVE MG/DL
GLUCOSE SERPL-MCNC: 96 MG/DL
HBA1C MFR BLD HPLC: 5.2 %
HCT VFR BLD CALC: 45.9 %
HDLC SERPL-MCNC: 30 MG/DL
HGB BLD-MCNC: 14.9 G/DL
IMM GRANULOCYTES NFR BLD AUTO: 0.2 %
KETONES URINE: NEGATIVE MG/DL
LDLC SERPL CALC-MCNC: 79 MG/DL
LEUKOCYTE ESTERASE URINE: NEGATIVE
LYMPHOCYTES # BLD AUTO: 2.4 K/UL
LYMPHOCYTES NFR BLD AUTO: 39.1 %
MAGNESIUM SERPL-MCNC: 2 MG/DL
MAN DIFF?: NORMAL
MCHC RBC-ENTMCNC: 29.5 PG
MCHC RBC-ENTMCNC: 32.5 G/DL
MCV RBC AUTO: 90.9 FL
MICROSCOPIC-UA: NORMAL
MONOCYTES # BLD AUTO: 0.5 K/UL
MONOCYTES NFR BLD AUTO: 8.1 %
NEUTROPHILS # BLD AUTO: 2.43 K/UL
NEUTROPHILS NFR BLD AUTO: 39.6 %
NITRITE URINE: NEGATIVE
NONHDLC SERPL-MCNC: 94 MG/DL
PH URINE: 6
PHOSPHATE SERPL-MCNC: 3.9 MG/DL
PLATELET # BLD AUTO: 267 K/UL
POTASSIUM SERPL-SCNC: 4.5 MMOL/L
PROT SERPL-MCNC: 6.9 G/DL
PROTEIN URINE: NEGATIVE MG/DL
RBC # BLD: 5.05 M/UL
RBC # FLD: 12.7 %
RED BLOOD CELLS URINE: 0 /HPF
SODIUM SERPL-SCNC: 140 MMOL/L
SPECIFIC GRAVITY URINE: 1.02
TRIGL SERPL-MCNC: 72 MG/DL
TSH SERPL-ACNC: 1.16 UIU/ML
UROBILINOGEN URINE: 0.2 MG/DL
WBC # FLD AUTO: 6.14 K/UL
WHITE BLOOD CELLS URINE: 0 /HPF

## 2024-11-25 ENCOUNTER — OUTPATIENT (OUTPATIENT)
Dept: OUTPATIENT SERVICES | Facility: HOSPITAL | Age: 29
LOS: 1 days | End: 2024-11-25
Payer: OTHER GOVERNMENT

## 2024-11-25 ENCOUNTER — APPOINTMENT (OUTPATIENT)
Dept: MRI IMAGING | Facility: CLINIC | Age: 29
End: 2024-11-25
Payer: OTHER GOVERNMENT

## 2024-11-25 ENCOUNTER — APPOINTMENT (OUTPATIENT)
Dept: MRI IMAGING | Facility: CLINIC | Age: 29
End: 2024-11-25

## 2024-11-25 ENCOUNTER — RESULT REVIEW (OUTPATIENT)
Age: 29
End: 2024-11-25

## 2024-11-25 DIAGNOSIS — R22.40 LOCALIZED SWELLING, MASS AND LUMP, UNSPECIFIED LOWER LIMB: ICD-10-CM

## 2024-11-25 PROCEDURE — 73720 MRI LWR EXTREMITY W/O&W/DYE: CPT | Mod: 26,RT

## 2024-11-25 PROCEDURE — 73720 MRI LWR EXTREMITY W/O&W/DYE: CPT

## 2024-11-25 PROCEDURE — A9585: CPT

## 2024-12-02 ENCOUNTER — APPOINTMENT (OUTPATIENT)
Dept: SURGERY | Facility: CLINIC | Age: 29
End: 2024-12-02
Payer: OTHER GOVERNMENT

## 2024-12-02 VITALS
HEIGHT: 73 IN | DIASTOLIC BLOOD PRESSURE: 82 MMHG | WEIGHT: 226 LBS | TEMPERATURE: 98.2 F | OXYGEN SATURATION: 98 % | RESPIRATION RATE: 16 BRPM | BODY MASS INDEX: 29.95 KG/M2 | SYSTOLIC BLOOD PRESSURE: 129 MMHG | HEART RATE: 61 BPM

## 2024-12-02 DIAGNOSIS — M79.606 PAIN IN LEG, UNSPECIFIED: ICD-10-CM

## 2024-12-02 PROCEDURE — 99204 OFFICE O/P NEW MOD 45 MIN: CPT

## 2025-01-02 ENCOUNTER — APPOINTMENT (OUTPATIENT)
Dept: FAMILY MEDICINE | Facility: CLINIC | Age: 30
End: 2025-01-02
Payer: OTHER GOVERNMENT

## 2025-01-02 VITALS
HEIGHT: 73 IN | WEIGHT: 229 LBS | OXYGEN SATURATION: 98 % | BODY MASS INDEX: 30.35 KG/M2 | SYSTOLIC BLOOD PRESSURE: 120 MMHG | DIASTOLIC BLOOD PRESSURE: 76 MMHG | HEART RATE: 62 BPM

## 2025-01-02 DIAGNOSIS — I10 ESSENTIAL (PRIMARY) HYPERTENSION: ICD-10-CM

## 2025-01-02 DIAGNOSIS — E78.2 MIXED HYPERLIPIDEMIA: ICD-10-CM

## 2025-01-02 DIAGNOSIS — Z00.00 ENCOUNTER FOR GENERAL ADULT MEDICAL EXAMINATION W/OUT ABNORMAL FINDINGS: ICD-10-CM

## 2025-01-02 DIAGNOSIS — Z86.79 PERSONAL HISTORY OF OTHER DISEASES OF THE CIRCULATORY SYSTEM: ICD-10-CM

## 2025-01-02 DIAGNOSIS — R22.40 LOCALIZED SWELLING, MASS AND LUMP, UNSPECIFIED LOWER LIMB: ICD-10-CM

## 2025-01-02 DIAGNOSIS — Z13.1 ENCOUNTER FOR SCREENING FOR DIABETES MELLITUS: ICD-10-CM

## 2025-01-02 DIAGNOSIS — R21 RASH AND OTHER NONSPECIFIC SKIN ERUPTION: ICD-10-CM

## 2025-01-02 DIAGNOSIS — Z13.29 ENCOUNTER FOR SCREENING FOR OTHER SUSPECTED ENDOCRINE DISORDER: ICD-10-CM

## 2025-01-02 DIAGNOSIS — D72.10 EOSINOPHILIA, UNSPECIFIED: ICD-10-CM

## 2025-01-02 PROCEDURE — 99213 OFFICE O/P EST LOW 20 MIN: CPT | Mod: 25

## 2025-01-02 PROCEDURE — 99395 PREV VISIT EST AGE 18-39: CPT

## 2025-01-02 RX ORDER — CETIRIZINE HYDROCHLORIDE 10 MG/1
10 TABLET, FILM COATED ORAL
Qty: 30 | Refills: 1 | Status: ACTIVE | COMMUNITY
Start: 2025-01-02

## 2025-01-02 RX ORDER — PREDNISONE 10 MG/1
10 TABLET ORAL
Qty: 42 | Refills: 0 | Status: ACTIVE | COMMUNITY
Start: 2025-01-02 | End: 1900-01-01

## 2025-01-08 ENCOUNTER — APPOINTMENT (OUTPATIENT)
Dept: AFTER HOURS CARE | Facility: EMERGENCY ROOM | Age: 30
End: 2025-01-08
Payer: OTHER GOVERNMENT

## 2025-01-08 DIAGNOSIS — S16.1XXA STRAIN OF MUSCLE, FASCIA AND TENDON AT NECK LEVEL, INITIAL ENCOUNTER: ICD-10-CM

## 2025-01-08 PROCEDURE — 99204 OFFICE O/P NEW MOD 45 MIN: CPT

## 2025-01-08 RX ORDER — NAPROXEN SODIUM 550 MG/1
550 TABLET ORAL
Qty: 20 | Refills: 0 | Status: ACTIVE | COMMUNITY
Start: 2025-01-08 | End: 1900-01-01

## 2025-01-08 RX ORDER — METHYLPREDNISOLONE 4 MG/1
4 TABLET ORAL
Qty: 1 | Refills: 0 | Status: ACTIVE | COMMUNITY
Start: 2025-01-08 | End: 1900-01-01

## 2025-01-08 RX ORDER — METHOCARBAMOL 750 MG/1
750 TABLET, FILM COATED ORAL 4 TIMES DAILY
Qty: 20 | Refills: 0 | Status: ACTIVE | COMMUNITY
Start: 2025-01-08 | End: 1900-01-01

## 2025-01-12 LAB
ALBUMIN SERPL ELPH-MCNC: 4.6 G/DL
ALP BLD-CCNC: 46 U/L
ALT SERPL-CCNC: 33 U/L
ANION GAP SERPL CALC-SCNC: 11 MMOL/L
APPEARANCE: CLEAR
AST SERPL-CCNC: 28 U/L
BACTERIA: NEGATIVE /HPF
BASOPHILS # BLD AUTO: 0.05 K/UL
BASOPHILS NFR BLD AUTO: 0.8 %
BILIRUB SERPL-MCNC: 0.6 MG/DL
BILIRUBIN URINE: NEGATIVE
BLOOD URINE: NEGATIVE
BUN SERPL-MCNC: 16 MG/DL
CALCIUM SERPL-MCNC: 9.8 MG/DL
CAST: 0 /LPF
CHLORIDE SERPL-SCNC: 103 MMOL/L
CHOLEST SERPL-MCNC: 234 MG/DL
CO2 SERPL-SCNC: 25 MMOL/L
COLOR: YELLOW
CREAT SERPL-MCNC: 1.17 MG/DL
EGFR: 88 ML/MIN/1.73M2
EOSINOPHIL # BLD AUTO: 0.52 K/UL
EOSINOPHIL NFR BLD AUTO: 8.1 %
EPITHELIAL CELLS: 0 /HPF
ESTIMATED AVERAGE GLUCOSE: 108 MG/DL
GLUCOSE QUALITATIVE U: NEGATIVE MG/DL
GLUCOSE SERPL-MCNC: 101 MG/DL
HBA1C MFR BLD HPLC: 5.4 %
HCT VFR BLD CALC: 48.2 %
HDLC SERPL-MCNC: 43 MG/DL
HGB BLD-MCNC: 15.6 G/DL
IMM GRANULOCYTES NFR BLD AUTO: 0.2 %
KETONES URINE: NEGATIVE MG/DL
LDLC SERPL CALC-MCNC: 160 MG/DL
LEUKOCYTE ESTERASE URINE: NEGATIVE
LYMPHOCYTES # BLD AUTO: 3.04 K/UL
LYMPHOCYTES NFR BLD AUTO: 47.4 %
MAN DIFF?: NORMAL
MCHC RBC-ENTMCNC: 29.7 PG
MCHC RBC-ENTMCNC: 32.4 G/DL
MCV RBC AUTO: 91.8 FL
MICROSCOPIC-UA: NORMAL
MONOCYTES # BLD AUTO: 0.53 K/UL
MONOCYTES NFR BLD AUTO: 8.3 %
NEUTROPHILS # BLD AUTO: 2.26 K/UL
NEUTROPHILS NFR BLD AUTO: 35.2 %
NITRITE URINE: NEGATIVE
NONHDLC SERPL-MCNC: 191 MG/DL
PH URINE: 5.5
PLATELET # BLD AUTO: 221 K/UL
POTASSIUM SERPL-SCNC: 4.4 MMOL/L
PROT SERPL-MCNC: 7 G/DL
PROTEIN URINE: NEGATIVE MG/DL
RBC # BLD: 5.25 M/UL
RBC # FLD: 13.2 %
RED BLOOD CELLS URINE: 0 /HPF
SODIUM SERPL-SCNC: 139 MMOL/L
SPECIFIC GRAVITY URINE: 1.02
TRIGL SERPL-MCNC: 169 MG/DL
TSH SERPL-ACNC: 0.81 UIU/ML
UROBILINOGEN URINE: 0.2 MG/DL
WBC # FLD AUTO: 6.41 K/UL
WHITE BLOOD CELLS URINE: 1 /HPF

## 2025-01-13 ENCOUNTER — NON-APPOINTMENT (OUTPATIENT)
Age: 30
End: 2025-01-13

## 2025-01-13 ENCOUNTER — APPOINTMENT (OUTPATIENT)
Dept: CARDIOLOGY | Facility: CLINIC | Age: 30
End: 2025-01-13
Payer: OTHER GOVERNMENT

## 2025-01-13 VITALS
WEIGHT: 225 LBS | SYSTOLIC BLOOD PRESSURE: 146 MMHG | HEIGHT: 73 IN | DIASTOLIC BLOOD PRESSURE: 83 MMHG | BODY MASS INDEX: 29.82 KG/M2 | HEART RATE: 50 BPM

## 2025-01-13 DIAGNOSIS — I10 ESSENTIAL (PRIMARY) HYPERTENSION: ICD-10-CM

## 2025-01-13 DIAGNOSIS — E78.2 MIXED HYPERLIPIDEMIA: ICD-10-CM

## 2025-01-13 PROCEDURE — 99214 OFFICE O/P EST MOD 30 MIN: CPT

## 2025-01-13 PROCEDURE — 93000 ELECTROCARDIOGRAM COMPLETE: CPT

## 2025-01-20 ENCOUNTER — NON-APPOINTMENT (OUTPATIENT)
Age: 30
End: 2025-01-20

## 2025-03-13 DIAGNOSIS — M25.529 PAIN IN UNSPECIFIED ELBOW: ICD-10-CM

## 2025-03-17 ENCOUNTER — APPOINTMENT (OUTPATIENT)
Dept: ORTHOPEDIC SURGERY | Facility: CLINIC | Age: 30
End: 2025-03-17
Payer: OTHER GOVERNMENT

## 2025-03-17 DIAGNOSIS — M77.02 MEDIAL EPICONDYLITIS, LEFT ELBOW: ICD-10-CM

## 2025-03-17 DIAGNOSIS — M77.01 MEDIAL EPICONDYLITIS, RIGHT ELBOW: ICD-10-CM

## 2025-03-17 PROCEDURE — 99214 OFFICE O/P EST MOD 30 MIN: CPT

## 2025-03-17 PROCEDURE — 73080 X-RAY EXAM OF ELBOW: CPT | Mod: 50

## 2025-03-17 RX ORDER — MELOXICAM 15 MG/1
15 TABLET ORAL
Qty: 21 | Refills: 3 | Status: ACTIVE | COMMUNITY
Start: 2025-03-17 | End: 1900-01-01

## 2025-03-20 RX ORDER — DICLOFENAC SODIUM 10 MG/G
1 GEL TOPICAL
Qty: 3 | Refills: 0 | Status: ACTIVE | COMMUNITY
Start: 2025-03-20 | End: 1900-01-01

## 2025-04-29 ENCOUNTER — APPOINTMENT (OUTPATIENT)
Dept: ORTHOPEDIC SURGERY | Facility: CLINIC | Age: 30
End: 2025-04-29
Payer: OTHER GOVERNMENT

## 2025-04-29 DIAGNOSIS — M77.02 MEDIAL EPICONDYLITIS, LEFT ELBOW: ICD-10-CM

## 2025-04-29 DIAGNOSIS — M77.01 MEDIAL EPICONDYLITIS, RIGHT ELBOW: ICD-10-CM

## 2025-04-29 PROCEDURE — 99214 OFFICE O/P EST MOD 30 MIN: CPT | Mod: 25

## 2025-04-29 PROCEDURE — 20551 NJX 1 TENDON ORIGIN/INSJ: CPT | Mod: RT
